# Patient Record
Sex: MALE | Race: WHITE | NOT HISPANIC OR LATINO | URBAN - NONMETROPOLITAN AREA
[De-identification: names, ages, dates, MRNs, and addresses within clinical notes are randomized per-mention and may not be internally consistent; named-entity substitution may affect disease eponyms.]

---

## 2021-12-28 ENCOUNTER — TRANSCRIBE ORDERS (OUTPATIENT)
Dept: LAB | Facility: HOSPITAL | Age: 41
End: 2021-12-28

## 2021-12-28 DIAGNOSIS — Z00.00 ROUTINE GENERAL MEDICAL EXAMINATION AT A HEALTH CARE FACILITY: Primary | ICD-10-CM

## 2021-12-28 DIAGNOSIS — E55.9 VITAMIN D DEFICIENCY: ICD-10-CM

## 2021-12-28 DIAGNOSIS — E06.5 HYPOTHYROIDISM DUE TO FIBROUS INVASIVE THYROIDITIS: ICD-10-CM

## 2021-12-28 DIAGNOSIS — E03.8 HYPOTHYROIDISM DUE TO FIBROUS INVASIVE THYROIDITIS: ICD-10-CM

## 2024-02-03 ENCOUNTER — HOSPITAL ENCOUNTER (EMERGENCY)
Facility: HOSPITAL | Age: 44
Discharge: HOME | End: 2024-02-03
Attending: EMERGENCY MEDICINE
Payer: COMMERCIAL

## 2024-02-03 VITALS
BODY MASS INDEX: 29.55 KG/M2 | SYSTOLIC BLOOD PRESSURE: 135 MMHG | OXYGEN SATURATION: 96 % | DIASTOLIC BLOOD PRESSURE: 93 MMHG | HEIGHT: 68 IN | WEIGHT: 195 LBS | TEMPERATURE: 97 F | HEART RATE: 74 BPM | RESPIRATION RATE: 14 BRPM

## 2024-02-03 DIAGNOSIS — E11.65 POORLY CONTROLLED DIABETES MELLITUS: ICD-10-CM

## 2024-02-03 DIAGNOSIS — E11.65 TYPE 2 DIABETES MELLITUS WITH HYPERGLYCEMIA, WITHOUT LONG-TERM CURRENT USE OF INSULIN: Primary | ICD-10-CM

## 2024-02-03 LAB
ALBUMIN SERPL-MCNC: 4.4 G/DL (ref 3.5–5.2)
ALBUMIN/GLOB SERPL: 1.6 G/DL
ALP SERPL-CCNC: 65 U/L (ref 39–117)
ALT SERPL W P-5'-P-CCNC: 56 U/L (ref 1–41)
ANION GAP SERPL CALCULATED.3IONS-SCNC: 14 MMOL/L (ref 5–15)
AST SERPL-CCNC: 43 U/L (ref 1–40)
B-OH-BUTYR SERPL-SCNC: 0.15 MMOL/L (ref 0.02–0.27)
BASOPHILS # BLD AUTO: 0.02 10*3/MM3 (ref 0–0.2)
BASOPHILS NFR BLD AUTO: 0.3 % (ref 0–1.5)
BILIRUB SERPL-MCNC: 0.4 MG/DL (ref 0–1.2)
BILIRUB UR QL STRIP: NEGATIVE
BUN SERPL-MCNC: 7 MG/DL (ref 6–20)
BUN/CREAT SERPL: 7 (ref 7–25)
CALCIUM SPEC-SCNC: 9.3 MG/DL (ref 8.6–10.5)
CHLORIDE SERPL-SCNC: 94 MMOL/L (ref 98–107)
CLARITY UR: CLEAR
CO2 SERPL-SCNC: 25 MMOL/L (ref 22–29)
COLOR UR: YELLOW
CREAT SERPL-MCNC: 1 MG/DL (ref 0.76–1.27)
DEPRECATED RDW RBC AUTO: 48 FL (ref 37–54)
EGFRCR SERPLBLD CKD-EPI 2021: 94 ML/MIN/1.73
EOSINOPHIL # BLD AUTO: 0.09 10*3/MM3 (ref 0–0.4)
EOSINOPHIL NFR BLD AUTO: 1.5 % (ref 0.3–6.2)
ERYTHROCYTE [DISTWIDTH] IN BLOOD BY AUTOMATED COUNT: 13.7 % (ref 12.3–15.4)
GLOBULIN UR ELPH-MCNC: 2.8 GM/DL
GLUCOSE BLDC GLUCOMTR-MCNC: 307 MG/DL (ref 70–130)
GLUCOSE BLDC GLUCOMTR-MCNC: 314 MG/DL (ref 70–130)
GLUCOSE SERPL-MCNC: 306 MG/DL (ref 65–99)
GLUCOSE UR STRIP-MCNC: ABNORMAL MG/DL
HCT VFR BLD AUTO: 45.1 % (ref 37.5–51)
HGB BLD-MCNC: 15.1 G/DL (ref 13–17.7)
HGB UR QL STRIP.AUTO: NEGATIVE
IMM GRANULOCYTES # BLD AUTO: 0.01 10*3/MM3 (ref 0–0.05)
IMM GRANULOCYTES NFR BLD AUTO: 0.2 % (ref 0–0.5)
KETONES UR QL STRIP: NEGATIVE
LEUKOCYTE ESTERASE UR QL STRIP.AUTO: NEGATIVE
LYMPHOCYTES # BLD AUTO: 2.38 10*3/MM3 (ref 0.7–3.1)
LYMPHOCYTES NFR BLD AUTO: 39.5 % (ref 19.6–45.3)
MCH RBC QN AUTO: 31.4 PG (ref 26.6–33)
MCHC RBC AUTO-ENTMCNC: 33.5 G/DL (ref 31.5–35.7)
MCV RBC AUTO: 93.8 FL (ref 79–97)
MONOCYTES # BLD AUTO: 0.49 10*3/MM3 (ref 0.1–0.9)
MONOCYTES NFR BLD AUTO: 8.1 % (ref 5–12)
NEUTROPHILS NFR BLD AUTO: 3.03 10*3/MM3 (ref 1.7–7)
NEUTROPHILS NFR BLD AUTO: 50.4 % (ref 42.7–76)
NITRITE UR QL STRIP: NEGATIVE
NRBC BLD AUTO-RTO: 0 /100 WBC (ref 0–0.2)
PH UR STRIP.AUTO: 6.5 [PH] (ref 5–8)
PLATELET # BLD AUTO: 196 10*3/MM3 (ref 140–450)
PMV BLD AUTO: 9.7 FL (ref 6–12)
POTASSIUM SERPL-SCNC: 3.4 MMOL/L (ref 3.5–5.2)
PROT SERPL-MCNC: 7.2 G/DL (ref 6–8.5)
PROT UR QL STRIP: NEGATIVE
RBC # BLD AUTO: 4.81 10*6/MM3 (ref 4.14–5.8)
SODIUM SERPL-SCNC: 133 MMOL/L (ref 136–145)
SP GR UR STRIP: 1.03 (ref 1–1.03)
UROBILINOGEN UR QL STRIP: ABNORMAL
WBC NRBC COR # BLD AUTO: 6.02 10*3/MM3 (ref 3.4–10.8)

## 2024-02-03 PROCEDURE — 82948 REAGENT STRIP/BLOOD GLUCOSE: CPT

## 2024-02-03 PROCEDURE — 36415 COLL VENOUS BLD VENIPUNCTURE: CPT

## 2024-02-03 PROCEDURE — 81003 URINALYSIS AUTO W/O SCOPE: CPT | Performed by: EMERGENCY MEDICINE

## 2024-02-03 PROCEDURE — 99283 EMERGENCY DEPT VISIT LOW MDM: CPT

## 2024-02-03 PROCEDURE — 85025 COMPLETE CBC W/AUTO DIFF WBC: CPT | Performed by: EMERGENCY MEDICINE

## 2024-02-03 PROCEDURE — 80053 COMPREHEN METABOLIC PANEL: CPT | Performed by: EMERGENCY MEDICINE

## 2024-02-03 PROCEDURE — 82010 KETONE BODYS QUAN: CPT | Performed by: EMERGENCY MEDICINE

## 2024-02-03 PROCEDURE — 25810000003 SODIUM CHLORIDE 0.9 % SOLUTION: Performed by: EMERGENCY MEDICINE

## 2024-02-03 RX ORDER — SODIUM CHLORIDE 0.9 % (FLUSH) 0.9 %
10 SYRINGE (ML) INJECTION AS NEEDED
Status: DISCONTINUED | OUTPATIENT
Start: 2024-02-03 | End: 2024-02-04 | Stop reason: HOSPADM

## 2024-02-03 RX ADMIN — SODIUM CHLORIDE 1000 ML: 9 INJECTION, SOLUTION INTRAVENOUS at 21:29

## 2024-02-03 NOTE — ED NOTES
Patient states he used to be diabetic, but he stopped taking his metformin. He states that his blood sugar today was 500

## 2024-02-03 NOTE — ED NOTES
"Pt to Er via PV. Pt states he has been feeling unwell for the last several days, reports his sugar today was in the 500s. States he used to take metformin and insulin but hasn't taken them for a while. Pt states he just feels \"a little off.\"   "

## 2024-02-04 NOTE — ED NOTES
Patient states that he has a history of IDDM. Patient states he took himself off of his insulin in 2018. Patient states that today he felt like he used to when his blood sugar was elevated. Patient states he used his old glucometer and his bgl was 511. Patient states he called a nurse with his insurance and she directed him to an  and UC sent him here.     Patient states he has not seen a regular doctor for 10 years - states that he would see an ER doctor for his diabetic medication.

## 2024-02-04 NOTE — DISCHARGE INSTRUCTIONS
Take metformin as prescribed.  Try to eat a low carbohydrate diet.  Follow-up with your new primary care provider later this month as scheduled.  Check your blood sugar regularly.  Return to the emergency department for chest pain, abdominal pain, vomiting, weakness, or other concern.

## 2024-02-04 NOTE — ED PROVIDER NOTES
" EMERGENCY DEPARTMENT ENCOUNTER    Room Number:    PCP: System, Provider Not In  Historian: Patient, spouse      HPI:  Chief Complaint: Elevated blood sugar  A complete HPI/ROS/PMH/PSH/SH/FH are unobtainable due to: Nothing  Context: Guilherme Garcia is a 44 y.o. male with a medical history of hyperglycemia who presents to the ED c/o acute elevated blood sugar.  Patient states he began feeling slightly \"woozy\" and lightheaded earlier this afternoon.  He checked his blood sugar and it was 511.  He drank some water and rechecked it and it was down into the 400s.  He called the nurse hotline for his health insurance and was advised to come to the ED.  He has not seen a primary care provider or had a routine checkup in many years.  He states that he was told he was prediabetic in 2018 after going to the ED.  He took insulin and metformin for couple of years but then stopped.  He also reports increased thirst and increased urinary frequency recently.  He has also had some nasal congestion.  Denies fever, chills, sore throat, cough, shortness of breath, chest pain, Sohan pain, vomiting, diarrhea, or dysuria.  He has an appointment with a new PCP later this month.            PAST MEDICAL HISTORY  Active Ambulatory Problems     Diagnosis Date Noted    No Active Ambulatory Problems     Resolved Ambulatory Problems     Diagnosis Date Noted    No Resolved Ambulatory Problems     Past Medical History:   Diagnosis Date    Diabetes mellitus          PAST SURGICAL HISTORY  History reviewed. No pertinent surgical history.      FAMILY HISTORY  History reviewed. No pertinent family history.      SOCIAL HISTORY  Social History     Socioeconomic History    Marital status:    Tobacco Use    Smoking status: Former     Types: Cigarettes     Quit date: 2018     Years since quittin.0   Vaping Use    Vaping Use: Every day    Substances: Nicotine    Devices: Refillable tank   Substance and Sexual Activity    Alcohol use: Not " Currently    Drug use: Not Currently     Comment: hx of narcotic pain medication    Sexual activity: Defer         ALLERGIES  Patient has no known allergies.    REVIEW OF SYSTEMS  Review of Systems  Included in HPI  All systems reviewed and negative except for those discussed in HPI.      PHYSICAL EXAM  ED Triage Vitals   Temp Heart Rate Resp BP SpO2   02/03/24 1803 02/03/24 1803 02/03/24 1803 02/03/24 1838 02/03/24 1803   97 °F (36.1 °C) 83 16 132/100 97 %      Temp src Heart Rate Source Patient Position BP Location FiO2 (%)   -- -- -- -- --              Physical Exam      GENERAL: Awake, alert, oriented x 3.  Well-developed, well-nourished and nontoxic-appearing male.  Resting comfortably in no acute distress  HENT: NCAT, nares patent, moist mucous membranes  EYES: no scleral icterus  CV: regular rhythm, normal rate  RESPIRATORY: normal effort, clear to auscultation bilaterally  ABDOMEN: soft, nondistended, nontender  MUSCULOSKELETAL: Extremities are nontender with full range of motion  NEURO: Speech is normal.  No facial droop.  Follows commands  PSYCH:  calm, cooperative  SKIN: warm, dry    Vital signs and nursing notes reviewed.          LAB RESULTS  Recent Results (from the past 24 hour(s))   POC Glucose Once    Collection Time: 02/03/24  6:36 PM    Specimen: Blood   Result Value Ref Range    Glucose 314 (H) 70 - 130 mg/dL   Urinalysis With Microscopic If Indicated (No Culture) - Urine, Clean Catch    Collection Time: 02/03/24  8:22 PM    Specimen: Urine, Clean Catch   Result Value Ref Range    Color, UA Yellow Yellow, Straw    Appearance, UA Clear Clear    pH, UA 6.5 5.0 - 8.0    Specific Gravity, UA 1.026 1.005 - 1.030    Glucose, UA >=1000 mg/dL (3+) (A) Negative    Ketones, UA Negative Negative    Bilirubin, UA Negative Negative    Blood, UA Negative Negative    Protein, UA Negative Negative    Leuk Esterase, UA Negative Negative    Nitrite, UA Negative Negative    Urobilinogen, UA 0.2 E.U./dL 0.2 - 1.0  E.U./dL   Comprehensive Metabolic Panel    Collection Time: 02/03/24  9:04 PM    Specimen: Blood   Result Value Ref Range    Glucose 306 (H) 65 - 99 mg/dL    BUN 7 6 - 20 mg/dL    Creatinine 1.00 0.76 - 1.27 mg/dL    Sodium 133 (L) 136 - 145 mmol/L    Potassium 3.4 (L) 3.5 - 5.2 mmol/L    Chloride 94 (L) 98 - 107 mmol/L    CO2 25.0 22.0 - 29.0 mmol/L    Calcium 9.3 8.6 - 10.5 mg/dL    Total Protein 7.2 6.0 - 8.5 g/dL    Albumin 4.4 3.5 - 5.2 g/dL    ALT (SGPT) 56 (H) 1 - 41 U/L    AST (SGOT) 43 (H) 1 - 40 U/L    Alkaline Phosphatase 65 39 - 117 U/L    Total Bilirubin 0.4 0.0 - 1.2 mg/dL    Globulin 2.8 gm/dL    A/G Ratio 1.6 g/dL    BUN/Creatinine Ratio 7.0 7.0 - 25.0    Anion Gap 14.0 5.0 - 15.0 mmol/L    eGFR 94.0 >60.0 mL/min/1.73   CBC Auto Differential    Collection Time: 02/03/24  9:04 PM    Specimen: Blood   Result Value Ref Range    WBC 6.02 3.40 - 10.80 10*3/mm3    RBC 4.81 4.14 - 5.80 10*6/mm3    Hemoglobin 15.1 13.0 - 17.7 g/dL    Hematocrit 45.1 37.5 - 51.0 %    MCV 93.8 79.0 - 97.0 fL    MCH 31.4 26.6 - 33.0 pg    MCHC 33.5 31.5 - 35.7 g/dL    RDW 13.7 12.3 - 15.4 %    RDW-SD 48.0 37.0 - 54.0 fl    MPV 9.7 6.0 - 12.0 fL    Platelets 196 140 - 450 10*3/mm3    Neutrophil % 50.4 42.7 - 76.0 %    Lymphocyte % 39.5 19.6 - 45.3 %    Monocyte % 8.1 5.0 - 12.0 %    Eosinophil % 1.5 0.3 - 6.2 %    Basophil % 0.3 0.0 - 1.5 %    Immature Grans % 0.2 0.0 - 0.5 %    Neutrophils, Absolute 3.03 1.70 - 7.00 10*3/mm3    Lymphocytes, Absolute 2.38 0.70 - 3.10 10*3/mm3    Monocytes, Absolute 0.49 0.10 - 0.90 10*3/mm3    Eosinophils, Absolute 0.09 0.00 - 0.40 10*3/mm3    Basophils, Absolute 0.02 0.00 - 0.20 10*3/mm3    Immature Grans, Absolute 0.01 0.00 - 0.05 10*3/mm3    nRBC 0.0 0.0 - 0.2 /100 WBC   Beta Hydroxybutyrate Quantitative    Collection Time: 02/03/24  9:04 PM    Specimen: Blood   Result Value Ref Range    Beta-Hydroxybutyrate Quant 0.154 0.020 - 0.270 mmol/L   POC Glucose Once    Collection Time: 02/03/24  10:15 PM    Specimen: Blood   Result Value Ref Range    Glucose 307 (H) 70 - 130 mg/dL       Ordered the above labs and reviewed the results.        RADIOLOGY  No Radiology Exams Resulted Within Past 24 Hours    Ordered the above noted radiological studies. Reviewed by me in PACS.            PROCEDURES  Procedures        OUTPATIENT MEDICATION MANAGEMENT:  No current Epic-ordered facility-administered medications on file.     Current Outpatient Medications Ordered in Epic   Medication Sig Dispense Refill    metFORMIN (GLUCOPHAGE) 500 MG tablet Take 1 tablet by mouth 2 (Two) Times a Day With Meals. 60 tablet 0    METHADONE HCL PO Take  by mouth Daily. Unknown dosage             MEDICATIONS GIVEN IN ER  Medications   sodium chloride 0.9 % bolus 1,000 mL (0 mL Intravenous Stopped 2/3/24 2236)                   MEDICAL DECISION MAKING, PROGRESS, and CONSULTS    All labs have been independently reviewed by me.  All radiology studies have been reviewed by me and I have also reviewed the radiology report.   EKG's independently viewed and interpreted by me.  Discussion below represents my analysis of pertinent findings related to patient's condition, differential diagnosis, treatment plan and final disposition.      Additional sources:    - Discussed/ obtained information from independent historians: Spouse at bedside    -Prescription drug monitoring program review:     N/A      - External (non-ED) record review: Patient was seen at another ED in September 2019 for right foot pain.  He does not have any prior ED visits or admissions here.    - Chronic or social conditions impacting patient care (Social Determinants of Health): Health Care System (Health Coverage, Provider Availability, Provider Linguistic and Cultural Competency, Quality of Care)     Patient has not had or seen a primary care provider for many years.          Orders placed during this visit:  Orders Placed This Encounter   Procedures    Comprehensive  Metabolic Panel    Urinalysis With Microscopic If Indicated (No Culture) - Urine, Clean Catch    CBC Auto Differential    Beta Hydroxybutyrate Quantitative    POC Glucose Once    POC Glucose Once    CBC & Differential    Ketone Bodies, Serum (Not performed at [place])         Additional orders considered but not ordered:          Differential diagnosis includes, but is not limited to:    Uncontrolled diabetes, DKA, electrolyte abnormality, dehydration, UTI      Independent interpretation of labs, radiology studies, and discussions with consultants:  ED Course as of 02/04/24 0133   Sat Feb 03, 2024 1955 Glucose(!): 314 [WH]   2049 Ketones, UA: Negative [WH]   2121 WBC: 6.02 [WH]   2121 Hemoglobin: 15.1 [WH]   2203 Beta-Hydroxybutyrate Quant: 0.154 []   2203 Glucose(!): 306 [WH]   2203 Creatinine: 1.00 [WH]   2203 Sodium(!): 133 [WH]   2203 CO2: 25.0 [WH]   2203 Anion Gap: 14.0 []   2218 MDM: Patient presented to ED complaining of elevated blood sugar.  He was diagnosed with prediabetes in 2018 and was started on metformin and insulin.  However, he only took these medications for short time.  Here in the ED, initial glucose was 316.  Patient was given IV fluids.  He was not in DKA.  Renal function was normal.  He was not dehydrated.  He did not have a UTI.  He will be started on metformin.  He has an appointment with a new primary care provider in 2 weeks. []      ED Course User Index  [] Иван Bee MD         COMPLEXITY OF CARE  Admission was considered but after careful review of the patient's presentation, physical examination, diagnostic results, and response to treatment the patient may be safely discharged with outpatient follow-up.      DIAGNOSIS  Final diagnoses:   Type 2 diabetes mellitus with hyperglycemia, without long-term current use of insulin   Poorly controlled diabetes mellitus         DISPOSITION  DISCHARGE    Patient discharged in stable condition.    Reviewed implications of  results, diagnosis, meds, responsibility to follow up, warning signs and symptoms of possible worsening, potential complications and reasons to return to ER, including chest pain, shortness of breath, abdominal pain, vomiting, fever, or other concern.    Patient/Family voiced understanding of above instructions.    Discussed plan for discharge, as there is no emergent indication for admission. Patient referred to primary care provider for BP management due to today's BP. Pt/family is agreeable and understands need for follow up and repeat testing.  Pt is aware that discharge does not mean that nothing is wrong but it indicates no emergency is present that requires admission and they must continue care with follow-up as given below or physician of their choice.     FOLLOW-UP  Your primary care provider    Go in 2 weeks  As scheduled         Medication List        New Prescriptions      metFORMIN 500 MG tablet  Commonly known as: GLUCOPHAGE  Take 1 tablet by mouth 2 (Two) Times a Day With Meals.               Where to Get Your Medications        These medications were sent to [id number] - [place], [place] - [address] [address] AT SEC OF [place] [address] & [place] [address] - [phone] [facility] [phone] [address] [place] [address] [address], [facility] [address]      Phone: [phone]   metFORMIN 500 MG tablet                   Latest Documented Vital Signs:  AS OF 01:33 EST VITALS:    BP - 135/93  HR - 74  TEMP - 97 °F (36.1 °C)  O2 SATS - 96%            --    Please note that portions of this were completed with a voice recognition program.       Note Disclaimer: At [facility], we believe that sharing information builds trust and better relationships. You are receiving this note because you are receiving care at [facility] or recently visited. It is possible you will see health information before a provider has talked with you about it. This kind of information can be easy to misunderstand. To help you  fully understand what it means for your health, we urge you to discuss this note with your provider.             Иван Bee MD  02/04/24 0133

## 2024-02-07 ENCOUNTER — OFFICE VISIT (OUTPATIENT)
Dept: FAMILY MEDICINE CLINIC | Facility: CLINIC | Age: 44
End: 2024-02-07
Payer: COMMERCIAL

## 2024-02-07 VITALS
WEIGHT: 188 LBS | RESPIRATION RATE: 14 BRPM | BODY MASS INDEX: 28.49 KG/M2 | OXYGEN SATURATION: 97 % | TEMPERATURE: 98.1 F | SYSTOLIC BLOOD PRESSURE: 130 MMHG | HEART RATE: 88 BPM | HEIGHT: 68 IN | DIASTOLIC BLOOD PRESSURE: 88 MMHG

## 2024-02-07 DIAGNOSIS — M10.9 GOUT, UNSPECIFIED CAUSE, UNSPECIFIED CHRONICITY, UNSPECIFIED SITE: ICD-10-CM

## 2024-02-07 DIAGNOSIS — E11.65 UNCONTROLLED TYPE 2 DIABETES MELLITUS WITH HYPERGLYCEMIA: Primary | ICD-10-CM

## 2024-02-07 DIAGNOSIS — Z86.19 HISTORY OF HEPATITIS B: ICD-10-CM

## 2024-02-07 DIAGNOSIS — Z12.11 ENCOUNTER FOR SCREENING FOR MALIGNANT NEOPLASM OF COLON: ICD-10-CM

## 2024-02-07 DIAGNOSIS — R04.2 SPITTING UP BLOOD: ICD-10-CM

## 2024-02-07 DIAGNOSIS — K21.9 GASTROESOPHAGEAL REFLUX DISEASE, UNSPECIFIED WHETHER ESOPHAGITIS PRESENT: ICD-10-CM

## 2024-02-07 DIAGNOSIS — Z11.59 NEED FOR HEPATITIS C SCREENING TEST: ICD-10-CM

## 2024-02-07 PROCEDURE — 3046F HEMOGLOBIN A1C LEVEL >9.0%: CPT | Performed by: PHYSICIAN ASSISTANT

## 2024-02-07 PROCEDURE — 1159F MED LIST DOCD IN RCRD: CPT | Performed by: PHYSICIAN ASSISTANT

## 2024-02-07 PROCEDURE — 1160F RVW MEDS BY RX/DR IN RCRD: CPT | Performed by: PHYSICIAN ASSISTANT

## 2024-02-07 PROCEDURE — 99204 OFFICE O/P NEW MOD 45 MIN: CPT | Performed by: PHYSICIAN ASSISTANT

## 2024-02-07 RX ORDER — BLOOD-GLUCOSE METER
1 KIT MISCELLANEOUS DAILY
Qty: 1 KIT | Refills: 0 | Status: SHIPPED | OUTPATIENT
Start: 2024-02-07

## 2024-02-07 RX ORDER — BLOOD SUGAR DIAGNOSTIC
STRIP MISCELLANEOUS
Qty: 100 EACH | Refills: 0 | Status: SHIPPED | OUTPATIENT
Start: 2024-02-07 | End: 2024-02-28 | Stop reason: SDUPTHER

## 2024-02-07 RX ORDER — LANCETS
EACH MISCELLANEOUS
Qty: 100 EACH | Refills: 0 | Status: SHIPPED | OUTPATIENT
Start: 2024-02-07

## 2024-02-07 NOTE — LETTER
March 1, 2024     [facility]  [address]  [facility] [address]    Patient: Guilherme Garcia   YOB: 1980   Date of Visit: 2/7/2024     Dear [facility]:       Thank you for referring Guilherme Garcia to me for evaluation. Below are the relevant portions of my assessment and plan of care.    If you have questions, please do not hesitate to call me. I look forward to following Guilherme along with you.         Sincerely,        BLAZE Muhammad        CC: No Recipients    Michelle Cabral PA  02/26/24 0725  Signed  Subjective  Guilherme Garcia is a 44 y.o. male who presents today as a new patient to establish care.     History of Present Illness     Previous PCP- not in decades. Dr Almeida- [facility].   [facility] and hospital in [place] has most of records. Previously [facility] but bought out by [facility].   Previously admit at that hospital- had Hepatitis B in the past. Did not know was diabetic but found out was diabetic. They told with watching diet, could be corrected. Was on Insulin and Metformin    A1C 15.1%, Glucose 314. Sodium 133.     With Metformin 250 has been the lowest and up to 350.   Not sure how long on [employer].   He reports he would like to get One Touch Verio meter, strips and lancets. He has been using a meter that he borrowed from someone but has to give back.     He is only on Metformin 500 mg twice daily. Increased to 3 x daily when glucose was elevated. 6-7 am, noon, and supper.    In the past, has only     Nasal congestion.   Glucose 511 at ER. Drank water and rechecked at 400s. Increased thirst and urination.   Prediabetes 2018. Reports he then took insulin and metformin for a couple years but stopped.     Has had increased indigestion. Does not cause bleeding. A couple times a week, awakens with blood in mouth that has to cough out.  Started this month. No unintentional weight loss. No night  sweats.     Hepatitis B- 2017 or 2018 reports he was paralyzed and unable to walk. He had to go to rehab and learn to walk again. Reports he was sent to [facility] because [facility] could not handle. Rehab was in [place]. [facility] started on Insulin and Metformin.     Past Medical History:   Diagnosis Date   • Hepatitis B     TOOK MEDS   • Bleeding in mouth     WHEN WAKES UP IN THE AM   • Diabetes mellitus    • Dry throat     AND IRRITATED WHEN WAKE UP MORNING   • GERD (gastroesophageal reflux disease)    • History of kidney stones      Past Surgical History:   Procedure Laterality Date   • ENDOSCOPY N/A 2024    Procedure: ESOPHAGOGASTRODUODENOSCOPY with cold biopsies;  Surgeon: Elijah Amaral MD;  Location: [facility] ENDOSCOPY;  Service: Gastroenterology;  Laterality: N/A;  pre: dyspepsia, globus  post: esophagitis, gastritis, hiatal hernia   • KIDNEY STONE SURGERY       Social History     Socioeconomic History   • Marital status:    Tobacco Use   • Smoking status: Former     Packs/day: 1.00     Years: 27.00     Additional pack years: 0.00     Total pack years: 27.00     Types: Cigarettes     Start date:      Quit date:      Years since quittin.1   • Smokeless tobacco: Never   Vaping Use   • Vaping Use: Every day   • Substances: Nicotine   • Devices: Refillable tank   Substance and Sexual Activity   • Alcohol use: Not Currently   • Drug use: Not Currently     Comment: hx of narcotic pain medication SOBER SINCE 2022   • Sexual activity: Defer      Family History   Problem Relation Age of Onset   • Malig Hyperthermia Neg Hx         The following portions of the patient's history were reviewed and updated as appropriate: allergies, current medications, past family history, past medical history, past social history, past surgical history and problem list.    Review of Systems    Objective  Vitals:    24 1043   BP: 130/88   Pulse: 88   Resp: 14   Temp: 98.1 °F (36.7 °C)   SpO2:  97%     Body mass index is 28.58 kg/m².    Physical Exam  Vitals and nursing note reviewed.   Constitutional:       Appearance: He is well-developed.   HENT:      Head: Normocephalic and atraumatic.      Right Ear: External ear normal.      Left Ear: External ear normal.   Eyes:      Conjunctiva/sclera: Conjunctivae normal.   Neck:      Thyroid: No thyroid mass or thyromegaly.      Vascular: No carotid bruit.      Trachea: No tracheal deviation.   Cardiovascular:      Rate and Rhythm: Normal rate and regular rhythm.      Heart sounds: Normal heart sounds.   Pulmonary:      Effort: Pulmonary effort is normal.      Breath sounds: Normal breath sounds.   Skin:     General: Skin is warm and dry.   Neurological:      Mental Status: He is alert and oriented to person, place, and time.      Gait: Gait normal.   Psychiatric:         Behavior: Behavior normal.         Thought Content: Thought content normal.         Assessment & Plan  Diagnoses and all orders for this visit:    1. Uncontrolled type 2 diabetes mellitus with hyperglycemia (Primary)  -     Hepatitis C Antibody  -     Microalbumin / Creatinine Urine Ratio - Urine, Clean Catch  -     CBC & Differential  -     Comprehensive Metabolic Panel  -     Hemoglobin A1c  -     CK  -     Lipid Panel With LDL / HDL Ratio  -     Iron and TIBC  -     Ferritin  -     Vitamin B12 & Folate  -     Vitamin D,25-Hydroxy  -     TSH  -     T4, free  -     T3, Free  -     Uric Acid  -     Urinalysis With Culture If Indicated -  -     Hepatitis B Surface Antigen  -     HBV Quant PCR Rfx to Genotype  -     Ambulatory Referral to ENT (Otolaryngology)  -     CT Chest Without Contrast; Future  -     Ambulatory Referral to Gastroenterology  -     metFORMIN (GLUCOPHAGE) 500 MG tablet; Take 2 tablets by mouth 2 (Two) Times a Day With Meals.  Dispense: 120 tablet; Refill: 0  -     glucose blood (OneTouch Verio) test strip; Check glucose 3 x daily  Dispense: 100 each; Refill: 0  -     Lancets  "(onetouch ultrasoft) lancets; Check glucose 3 x daily for hyperglycemia  Dispense: 100 each; Refill: 0  -     Blood Glucose Monitoring Suppl (OneTouch Verio Reflect) w/Device kit; Use 1 each Daily.  Dispense: 1 kit; Refill: 0  -     Microscopic Examination -    2. Gout, unspecified cause, unspecified chronicity, unspecified site  -     Uric Acid    3. Spitting up blood  -     Ambulatory Referral to ENT (Otolaryngology)  -     CT Chest Without Contrast; Future  -     Ambulatory Referral to Gastroenterology    4. Gastroesophageal reflux disease, unspecified whether esophagitis present  -     Ambulatory Referral to Gastroenterology    5. History of hepatitis B  -     Hepatitis B Surface Antigen  -     HBV Quant PCR Rfx to Genotype    6. Need for hepatitis C screening test  -     Hepatitis C Antibody    7. Encounter for screening for malignant neoplasm of colon        Assessment and Plan  Patient is establishing care after hospital visit after feeling \"woozy\" and light-headed and checking glucose at home at 511.  He was started on Metformin and is establishing care due to no PCP in years. Patient will have fasting labs. Call if no results in 1 week. Stability of conditions, plan, follow up, and further recommendations pending labs. Follow up in 2 weeks for review of records, glucose log, and further evaluation.     Uncontrolled, neglected diabetes mellitus with hyperglycemia- He is a poor historian but has not seen PCP in years. He was told prediabetic in ER 2018 but reports he was given Metformin and Insulin. He reports taking for a couple years then stopping without direction of follow up. He then started to feel light-headed and also had polyuria, polydipsia, and some nasal congestion. Glucose was noted to bee 511 and he went to ER. A1C was 15.1%. He was started on Metformin 500 mg twice daily and discharged from ER to see us.  I will check additional labs and make further recommendations. He will likely need " "additional medication. We will also need to ensure he updates diabetic eye and foot exams and I will give glucometer to monitor glucose fasting in the morning, 2 hours after largest meal, and bedtime. To submit log in the next 3-7 days.   History of gout- He has possible history of gout. I will check uric acid with labs. No symptoms today. To be seen if recurrence of symptoms.   GERD, spitting up blood- He reports having very uncontrolled. GERD symptoms but also reports \"spitting up\" blood that he is unsure if he is coughing up, is coming from uncontrolled GERD, or if this is from drainage from sinuses. He is a very poor historian and initially reported symptoms to get advice for a friend then admitted symptoms were from him. I will check labs, refer for CT chest, to GI to consider EGD and colonoscopy, and to ENT for evaluation of sinuses and consideration of laryngoscope. For now, I will start Protonix 40 mg daily. To ER ASAP if bleeding.   Possible history of hepatitis B- He is a poor historian but may have had history of hepatitis B and hospitalization in the past. I asked that he sign a release for hospital records and I will check hepatitis B and C testing. Of note, he has Methadone on medication list- it is unclear the history of drug use or possible risk factors for hepatitis. I will discuss this more at follow up with review of records.     I spent 35 minutes caring for Guilherme Garcia on this date of service. This time includes time spent by me in the following activities as necessary: preparing for the visit, reviewing tests, specialists records and previous visits, obtaining and/or reviewing a separately obtained history, performing a medically appropriate exam and/or evaluation, counseling and educating the patient, family, caregiver, referring and/or communicating with other healthcare professionals, documenting information in the medical record, independently interpreting results and communicating that " information with the patient, family, caregiver, and developing a medically appropriate treatment plan with consideration of other conditions, medications, and treatments.

## 2024-02-07 NOTE — LETTER
March 1, 2024     [facility]  [address]  [facility] [address]    Patient: Giulherme Garcia   YOB: 1980   Date of Visit: 2/7/2024     Dear [facility]:       Thank you for referring Guilherme Garcia to me for evaluation. Below are the relevant portions of my assessment and plan of care.    If you have questions, please do not hesitate to call me. I look forward to following Guilherme along with you.         Sincerely,        BLAZE Muhammad        CC: No Recipients    Michelle Cabral PA  02/26/24 0725  Signed  Subjective  Guilherme Garcia is a 44 y.o. male who presents today as a new patient to establish care.     History of Present Illness     Previous PCP- not in decades. Dr Almeida- [facility].   [facility] and hospital in [place] has most of records. Previously [facility] but bought out by [place].   Previously admit at that hospital- had Hepatitis B in the past. Did not know was diabetic but found out was diabetic. They told with watching diet, could be corrected. Was on Insulin and Metformin    A1C 15.1%, Glucose 314. Sodium 133.     With Metformin 250 has been the lowest and up to 350.   Not sure how long on Passport.   He reports he would like to get One Touch Verio meter, strips and lancets. He has been using a meter that he borrowed from someone but has to give back.     He is only on Metformin 500 mg twice daily. Increased to 3 x daily when glucose was elevated. 6-7 am, noon, and supper.    In the past, has only     Nasal congestion.   Glucose 511 at ER. Drank water and rechecked at 400s. Increased thirst and urination.   Prediabetes 2018. Reports he then took insulin and metformin for a couple years but stopped.     Has had increased indigestion. Does not cause bleeding. A couple times a week, awakens with blood in mouth that has to cough out.  Started this month. No unintentional weight loss. No night  sweats.     Hepatitis B- 2017 or 2018 reports he was paralyzed and unable to walk. He had to go to rehab and learn to walk again. Reports he was sent to [facility] because [employer] could not handle. Rehab was in [place]. [facility] started on Insulin and Metformin.     Past Medical History:   Diagnosis Date   • Hepatitis B     TOOK MEDS   • Bleeding in mouth     WHEN WAKES UP IN THE AM   • Diabetes mellitus    • Dry throat     AND IRRITATED WHEN WAKE UP MORNING   • GERD (gastroesophageal reflux disease)    • History of kidney stones      Past Surgical History:   Procedure Laterality Date   • ENDOSCOPY N/A 2024    Procedure: ESOPHAGOGASTRODUODENOSCOPY with cold biopsies;  Surgeon: Elijah Amaral MD;  Location: [facility] ENDOSCOPY;  Service: Gastroenterology;  Laterality: N/A;  pre: dyspepsia, globus  post: esophagitis, gastritis, hiatal hernia   • KIDNEY STONE SURGERY       Social History     Socioeconomic History   • Marital status:    Tobacco Use   • Smoking status: Former     Packs/day: 1.00     Years: 27.00     Additional pack years: 0.00     Total pack years: 27.00     Types: Cigarettes     Start date:      Quit date:      Years since quittin.1   • Smokeless tobacco: Never   Vaping Use   • Vaping Use: Every day   • Substances: Nicotine   • Devices: Refillable tank   Substance and Sexual Activity   • Alcohol use: Not Currently   • Drug use: Not Currently     Comment: hx of narcotic pain medication SOBER SINCE 2022   • Sexual activity: Defer      Family History   Problem Relation Age of Onset   • Malig Hyperthermia Neg Hx         The following portions of the patient's history were reviewed and updated as appropriate: allergies, current medications, past family history, past medical history, past social history, past surgical history and problem list.    Review of Systems    Objective  Vitals:    24 1043   BP: 130/88   Pulse: 88   Resp: 14   Temp: 98.1 °F (36.7 °C)   SpO2:  97%     Body mass index is 28.58 kg/m².    Physical Exam  Vitals and nursing note reviewed.   Constitutional:       Appearance: He is well-developed.   HENT:      Head: Normocephalic and atraumatic.      Right Ear: External ear normal.      Left Ear: External ear normal.   Eyes:      Conjunctiva/sclera: Conjunctivae normal.   Neck:      Thyroid: No thyroid mass or thyromegaly.      Vascular: No carotid bruit.      Trachea: No tracheal deviation.   Cardiovascular:      Rate and Rhythm: Normal rate and regular rhythm.      Heart sounds: Normal heart sounds.   Pulmonary:      Effort: Pulmonary effort is normal.      Breath sounds: Normal breath sounds.   Skin:     General: Skin is warm and dry.   Neurological:      Mental Status: He is alert and oriented to person, place, and time.      Gait: Gait normal.   Psychiatric:         Behavior: Behavior normal.         Thought Content: Thought content normal.         Assessment & Plan  Diagnoses and all orders for this visit:    1. Uncontrolled type 2 diabetes mellitus with hyperglycemia (Primary)  -     Hepatitis C Antibody  -     Microalbumin / Creatinine Urine Ratio - Urine, Clean Catch  -     CBC & Differential  -     Comprehensive Metabolic Panel  -     Hemoglobin A1c  -     CK  -     Lipid Panel With LDL / HDL Ratio  -     Iron and TIBC  -     Ferritin  -     Vitamin B12 & Folate  -     Vitamin D,25-Hydroxy  -     TSH  -     T4, free  -     T3, Free  -     Uric Acid  -     Urinalysis With Culture If Indicated -  -     Hepatitis B Surface Antigen  -     HBV Quant PCR Rfx to Genotype  -     Ambulatory Referral to ENT (Otolaryngology)  -     CT Chest Without Contrast; Future  -     Ambulatory Referral to Gastroenterology  -     metFORMIN (GLUCOPHAGE) 500 MG tablet; Take 2 tablets by mouth 2 (Two) Times a Day With Meals.  Dispense: 120 tablet; Refill: 0  -     glucose blood (OneTouch Verio) test strip; Check glucose 3 x daily  Dispense: 100 each; Refill: 0  -     Lancets  "(onetouch ultrasoft) lancets; Check glucose 3 x daily for hyperglycemia  Dispense: 100 each; Refill: 0  -     Blood Glucose Monitoring Suppl (OneTouch Verio Reflect) w/Device kit; Use 1 each Daily.  Dispense: 1 kit; Refill: 0  -     Microscopic Examination -    2. Gout, unspecified cause, unspecified chronicity, unspecified site  -     Uric Acid    3. Spitting up blood  -     Ambulatory Referral to ENT (Otolaryngology)  -     CT Chest Without Contrast; Future  -     Ambulatory Referral to Gastroenterology    4. Gastroesophageal reflux disease, unspecified whether esophagitis present  -     Ambulatory Referral to Gastroenterology    5. History of hepatitis B  -     Hepatitis B Surface Antigen  -     HBV Quant PCR Rfx to Genotype    6. Need for hepatitis C screening test  -     Hepatitis C Antibody    7. Encounter for screening for malignant neoplasm of colon        Assessment and Plan  Patient is establishing care after hospital visit after feeling \"woozy\" and light-headed and checking glucose at home at 511.  He was started on Metformin and is establishing care due to no PCP in years. Patient will have fasting labs. Call if no results in 1 week. Stability of conditions, plan, follow up, and further recommendations pending labs. Follow up in 2 weeks for review of records, glucose log, and further evaluation.     Uncontrolled, neglected diabetes mellitus with hyperglycemia- He is a poor historian but has not seen PCP in years. He was told prediabetic in ER 2018 but reports he was given Metformin and Insulin. He reports taking for a couple years then stopping without direction of follow up. He then started to feel light-headed and also had polyuria, polydipsia, and some nasal congestion. Glucose was noted to bee 511 and he went to ER. A1C was 15.1%. He was started on Metformin 500 mg twice daily and discharged from ER to see us.  I will check additional labs and make further recommendations. He will likely need " "additional medication. We will also need to ensure he updates diabetic eye and foot exams and I will give glucometer to monitor glucose fasting in the morning, 2 hours after largest meal, and bedtime. To submit log in the next 3-7 days.   History of gout- He has possible history of gout. I will check uric acid with labs. No symptoms today. To be seen if recurrence of symptoms.   GERD, spitting up blood- He reports having very uncontrolled. GERD symptoms but also reports \"spitting up\" blood that he is unsure if he is coughing up, is coming from uncontrolled GERD, or if this is from drainage from sinuses. He is a very poor historian and initially reported symptoms to get advice for a friend then admitted symptoms were from him. I will check labs, refer for CT chest, to GI to consider EGD and colonoscopy, and to ENT for evaluation of sinuses and consideration of laryngoscope. For now, I will start Protonix 40 mg daily. To ER ASAP if bleeding.   Possible history of hepatitis B- He is a poor historian but may have had history of hepatitis B and hospitalization in the past. I asked that he sign a release for hospital records and I will check hepatitis B and C testing. Of note, he has Methadone on medication list- it is unclear the history of drug use or possible risk factors for hepatitis. I will discuss this more at follow up with review of records.     I spent 35 minutes caring for Guilherme Garcia on this date of service. This time includes time spent by me in the following activities as necessary: preparing for the visit, reviewing tests, specialists records and previous visits, obtaining and/or reviewing a separately obtained history, performing a medically appropriate exam and/or evaluation, counseling and educating the patient, family, caregiver, referring and/or communicating with other healthcare professionals, documenting information in the medical record, independently interpreting results and communicating that " information with the patient, family, caregiver, and developing a medically appropriate treatment plan with consideration of other conditions, medications, and treatments.

## 2024-02-07 NOTE — PROGRESS NOTES
Subjective   Guilherme Garcia is a 44 y.o. male who presents today as a new patient to establish care.     History of Present Illness     Previous PCP- not in decades. Dr Almeida- [facility].   [facility] and hospital in [place] has most of records. Previously [facility] but bought out by [employer].   Previously admit at that hospital- had Hepatitis B in the past. Did not know was diabetic but found out was diabetic. They told with watching diet, could be corrected. Was on Insulin and Metformin    A1C 15.1%, Glucose 314. Sodium 133.     With Metformin 250 has been the lowest and up to 350.   Not sure how long on [employer]port.   He reports he would like to get One Touch Verio meter, strips and lancets. He has been using a meter that he borrowed from someone but has to give back.     He is only on Metformin 500 mg twice daily. Increased to 3 x daily when glucose was elevated. 6-7 am, noon, and supper.    In the past, has only     Nasal congestion.   Glucose 511 at ER. Drank water and rechecked at 400s. Increased thirst and urination.   Prediabetes 2018. Reports he then took insulin and metformin for a couple years but stopped.     Has had increased indigestion. Does not cause bleeding. A couple times a week, awakens with blood in mouth that has to cough out.  Started this month. No unintentional weight loss. No night sweats.     Hepatitis B- 2017 or 2018 reports he was paralyzed and unable to walk. He had to go to rehab and learn to walk again. Reports he was sent to [facility] because [employer] could not handle. Rehab was in [place]. [facility] started on Insulin and Metformin.     Past Medical History:   Diagnosis Date    Hepatitis B 2014    TOOK MEDS    Bleeding in mouth     WHEN WAKES UP IN THE AM    Diabetes mellitus     Dry throat     AND IRRITATED WHEN WAKE UP MORNING    GERD (gastroesophageal reflux disease)     History of kidney stones      Past Surgical History:   Procedure Laterality Date     ENDOSCOPY N/A 2024    Procedure: ESOPHAGOGASTRODUODENOSCOPY with cold biopsies;  Surgeon: Elijah Amaral MD;  [facility]: [facility] ENDOSCOPY;  Service: Gastroenterology;  Laterality: N/A;  pre: dyspepsia, globus  post: esophagitis, gastritis, hiatal hernia    KIDNEY STONE SURGERY       Social History     Socioeconomic History    Marital status:    Tobacco Use    Smoking status: Former     Packs/day: 1.00     Years: 27.00     Additional pack years: 0.00     Total pack years: 27.00     Types: Cigarettes     Start date:      Quit date:      Years since quittin.1    Smokeless tobacco: Never   Vaping Use    Vaping Use: Every day    Substances: Nicotine    Devices: Refillable tank   Substance and Sexual Activity    Alcohol use: Not Currently    Drug use: Not Currently     Comment: hx of narcotic pain medication SOBER SINCE 2022    Sexual activity: Defer      Family History   Problem Relation Age of Onset    Malig Hyperthermia Neg Hx         The following portions of the patient's history were reviewed and updated as appropriate: allergies, current medications, past family history, past medical history, past social history, past surgical history and problem list.    Review of Systems    Objective   Vitals:    24 1043   BP: 130/88   Pulse: 88   Resp: 14   Temp: 98.1 °F (36.7 °C)   SpO2: 97%     Body mass index is 28.58 kg/m².    Physical Exam  Vitals and nursing note reviewed.   Constitutional:       Appearance: He is well-developed.   HENT:      Head: Normocephalic and atraumatic.      Right Ear: External ear normal.      Left Ear: External ear normal.   Eyes:      Conjunctiva/sclera: Conjunctivae normal.   Neck:      Thyroid: No thyroid mass or thyromegaly.      Vascular: No carotid bruit.      Trachea: No tracheal deviation.   Cardiovascular:      Rate and Rhythm: Normal rate and regular rhythm.      Heart sounds: Normal heart sounds.   Pulmonary:      Effort: Pulmonary effort is  normal.      Breath sounds: Normal breath sounds.   Skin:     General: Skin is warm and dry.   Neurological:      Mental Status: He is alert and oriented to person, place, and time.      Gait: Gait normal.   Psychiatric:         Behavior: Behavior normal.         Thought Content: Thought content normal.         Assessment & Plan   Diagnoses and all orders for this visit:    1. Uncontrolled type 2 diabetes mellitus with hyperglycemia (Primary)  -     Hepatitis C Antibody  -     Microalbumin / Creatinine Urine Ratio - Urine, Clean Catch  -     CBC & Differential  -     Comprehensive Metabolic Panel  -     Hemoglobin A1c  -     CK  -     Lipid Panel With LDL / HDL Ratio  -     Iron and TIBC  -     Ferritin  -     Vitamin B12 & Folate  -     Vitamin D,25-Hydroxy  -     TSH  -     T4, free  -     T3, Free  -     Uric Acid  -     Urinalysis With Culture If Indicated -  -     Hepatitis B Surface Antigen  -     HBV Quant PCR Rfx to Genotype  -     Ambulatory Referral to ENT (Otolaryngology)  -     CT Chest Without Contrast; Future  -     Ambulatory Referral to Gastroenterology  -     metFORMIN (GLUCOPHAGE) 500 MG tablet; Take 2 tablets by mouth 2 (Two) Times a Day With Meals.  Dispense: 120 tablet; Refill: 0  -     glucose blood (OneTouch Verio) test strip; Check glucose 3 x daily  Dispense: 100 each; Refill: 0  -     Lancets (onetouch ultrasoft) lancets; Check glucose 3 x daily for hyperglycemia  Dispense: 100 each; Refill: 0  -     Blood Glucose Monitoring Suppl (OneTouch Verio Reflect) w/Device kit; Use 1 each Daily.  Dispense: 1 kit; Refill: 0  -     Microscopic Examination -    2. Gout, unspecified cause, unspecified chronicity, unspecified site  -     Uric Acid    3. Spitting up blood  -     Ambulatory Referral to ENT (Otolaryngology)  -     CT Chest Without Contrast; Future  -     Ambulatory Referral to Gastroenterology    4. Gastroesophageal reflux disease, unspecified whether esophagitis present  -     Ambulatory  "Referral to Gastroenterology    5. History of hepatitis B  -     Hepatitis B Surface Antigen  -     HBV Quant PCR Rfx to Genotype    6. Need for hepatitis C screening test  -     Hepatitis C Antibody    7. Encounter for screening for malignant neoplasm of colon        Assessment and Plan  Patient is establishing care after hospital visit after feeling \"woozy\" and light-headed and checking glucose at home at 511.  He was started on Metformin and is establishing care due to no PCP in years. Patient will have fasting labs. Call if no results in 1 week. Stability of conditions, plan, follow up, and further recommendations pending labs. Follow up in 2 weeks for review of records, glucose log, and further evaluation.     Uncontrolled, neglected diabetes mellitus with hyperglycemia- He is a poor historian but has not seen PCP in years. He was told prediabetic in ER 2018 but reports he was given Metformin and Insulin. He reports taking for a couple years then stopping without direction of follow up. He then started to feel light-headed and also had polyuria, polydipsia, and some nasal congestion. Glucose was noted to bee 511 and he went to ER. A1C was 15.1%. He was started on Metformin 500 mg twice daily and discharged from ER to see us.  I will check additional labs and make further recommendations. He will likely need additional medication. We will also need to ensure he updates diabetic eye and foot exams and I will give glucometer to monitor glucose fasting in the morning, 2 hours after largest meal, and bedtime. To submit log in the next 3-7 days.   History of gout- He has possible history of gout. I will check uric acid with labs. No symptoms today. To be seen if recurrence of symptoms.   GERD, spitting up blood- He reports having very uncontrolled. GERD symptoms but also reports \"spitting up\" blood that he is unsure if he is coughing up, is coming from uncontrolled GERD, or if this is from drainage from sinuses. He " is a very poor historian and initially reported symptoms to get advice for a friend then admitted symptoms were from him. I will check labs, refer for CT chest, to GI to consider EGD and colonoscopy, and to ENT for evaluation of sinuses and consideration of laryngoscope. For now, I will start Protonix 40 mg daily. To ER ASAP if bleeding.   Possible history of hepatitis B- He is a poor historian but may have had history of hepatitis B and hospitalization in the past. I asked that he sign a release for hospital records and I will check hepatitis B and C testing. Of note, he has Methadone on medication list- it is unclear the history of drug use or possible risk factors for hepatitis. I will discuss this more at follow up with review of records.     I spent 35 minutes caring for Guilherme Garcia on this date of service. This time includes time spent by me in the following activities as necessary: preparing for the visit, reviewing tests, specialists records and previous visits, obtaining and/or reviewing a separately obtained history, performing a medically appropriate exam and/or evaluation, counseling and educating the patient, family, caregiver, referring and/or communicating with other healthcare professionals, documenting information in the medical record, independently interpreting results and communicating that information with the patient, family, caregiver, and developing a medically appropriate treatment plan with consideration of other conditions, medications, and treatments.

## 2024-02-12 ENCOUNTER — TELEPHONE (OUTPATIENT)
Dept: GASTROENTEROLOGY | Facility: CLINIC | Age: 44
End: 2024-02-12
Payer: COMMERCIAL

## 2024-02-12 ENCOUNTER — OFFICE VISIT (OUTPATIENT)
Dept: GASTROENTEROLOGY | Facility: CLINIC | Age: 44
End: 2024-02-12
Payer: COMMERCIAL

## 2024-02-12 VITALS
SYSTOLIC BLOOD PRESSURE: 108 MMHG | HEIGHT: 69 IN | TEMPERATURE: 97.8 F | HEART RATE: 80 BPM | BODY MASS INDEX: 28.14 KG/M2 | WEIGHT: 190 LBS | DIASTOLIC BLOOD PRESSURE: 73 MMHG

## 2024-02-12 DIAGNOSIS — R13.10 ODYNOPHAGIA: ICD-10-CM

## 2024-02-12 DIAGNOSIS — R79.89 ELEVATED LIVER FUNCTION TESTS: ICD-10-CM

## 2024-02-12 DIAGNOSIS — R09.A2 GLOBUS SENSATION: ICD-10-CM

## 2024-02-12 DIAGNOSIS — R10.13 DYSPEPSIA: Primary | ICD-10-CM

## 2024-02-12 PROCEDURE — 1160F RVW MEDS BY RX/DR IN RCRD: CPT | Performed by: NURSE PRACTITIONER

## 2024-02-12 PROCEDURE — 1159F MED LIST DOCD IN RCRD: CPT | Performed by: NURSE PRACTITIONER

## 2024-02-12 PROCEDURE — 99204 OFFICE O/P NEW MOD 45 MIN: CPT | Performed by: NURSE PRACTITIONER

## 2024-02-12 RX ORDER — PANTOPRAZOLE SODIUM 40 MG/1
40 TABLET, DELAYED RELEASE ORAL DAILY
Qty: 90 TABLET | Refills: 3 | Status: SHIPPED | OUTPATIENT
Start: 2024-02-12

## 2024-02-12 NOTE — H&P (VIEW-ONLY)
Chief Complaint   Patient presents with    Dyspepsia       HPI    Guilherme Garcia is a  44 y.o. male here with a with a recent diagnosis of diabetes to establish care as a new patient for complaints of dyspepsia.    This patient will also follow with Dr. Amaral.    On visit today reports several months of dyspeptic symptoms, globus sensation, odynophagia and morning awakenings with the taste of blood in his mouth.  He has been taking Tums with variable improvement.  No nausea, vomiting, poor appetite or weight loss.  Denies early satiety.  He was recently diagnosed with type 2 diabetes with a A1c of 15 on initial diagnosis.  He recently started on metformin and is making major dietary changes.  He has yet to see a nutritionist.    No diarrhea, constipation or rectal bleeding.  Based on his age he is due for screening colonoscopy.  No family history of colon cancer.    Recent lab work with normal hemoglobin and mildly elevated LFTs.    Past Medical History:   Diagnosis Date    Diabetes mellitus        No past surgical history on file.    Scheduled Meds:     Continuous Infusions: No current facility-administered medications for this visit.      PRN Meds:     No Known Allergies    Social History     Socioeconomic History    Marital status:    Tobacco Use    Smoking status: Former     Packs/day: 1.00     Years: 27.00     Additional pack years: 0.00     Total pack years: 27.00     Types: Cigarettes     Start date:      Quit date: 2018     Years since quittin.1    Smokeless tobacco: Never   Vaping Use    Vaping Use: Every day    Substances: Nicotine    Devices: Refillable tank   Substance and Sexual Activity    Alcohol use: Not Currently    Drug use: Not Currently     Comment: hx of narcotic pain medication    Sexual activity: Defer       History reviewed. No pertinent family history.    Review of Systems   Gastrointestinal:  Negative for abdominal distention, abdominal pain, anal bleeding, blood in stool,  constipation, diarrhea, nausea, rectal pain and vomiting.       Vitals:    02/12/24 0946   BP: 108/73   Pulse: 80   Temp: 97.8 °F (36.6 °C)       Physical Exam  Constitutional:       Appearance: He is well-developed.   Abdominal:      General: Bowel sounds are normal. There is no distension.      Palpations: Abdomen is soft. There is no mass.      Tenderness: There is no abdominal tenderness. There is no guarding.      Hernia: No hernia is present.   Skin:     General: Skin is warm and dry.      Capillary Refill: Capillary refill takes less than 2 seconds.   Neurological:      Mental Status: He is alert and oriented to person, place, and time.   Psychiatric:         Behavior: Behavior normal.     Assessment    Diagnoses and all orders for this visit:    1. Dyspepsia (Primary)  -     Case Request; Standing  -     Obtain Informed Consent; Standing  -     Prepare and Witness Surgical Consent Form; Standing  -     Case Request    2. Odynophagia  -     Case Request; Standing  -     Obtain Informed Consent; Standing  -     Prepare and Witness Surgical Consent Form; Standing  -     Case Request    3. Globus sensation  -     Case Request; Standing  -     Obtain Informed Consent; Standing  -     Prepare and Witness Surgical Consent Form; Standing  -     Case Request    4. Elevated liver function tests  -     US Liver; Future    Other orders  -     pantoprazole (PROTONIX) 40 MG EC tablet; Take 1 tablet by mouth Daily.  Dispense: 90 tablet; Refill: 3       Plan    Schedule EGD with Dr. Amaral for further evaluation of symptoms offered screening colonoscopy as well but patient defers for now would like to think it over.  Risk-benefit of procedure reviewed the patient all questions were answered.  Informed the patient that colonoscopy is the screen for colon polyp/colon cancer and he is aware of potential missed lesion even in his cancer if he delays too long.  He is made an informed decision and prefers to move forward with EGD  only for now.    Begin Protonix 40 mg once daily in the interim.Antireflux measures and dietary modifications reviewed. Low acid diet reviewed. Keep head of bed elevated. Stop eating/drinking at least 3 hours prior to bedtime. Eliminate caffeine and carbonated beverages.  Weight loss encouraged if BMI over 25.  Recommend he see a nutritionist for diabetes counseling.  Continue to work on glycemic control along with dietary and lifestyle modifications.    Check liver ultrasound for mildly elevated LFTs.  Further recommendations and follow-up pending workup.          TODD Smith  [facility]  [address] [place] [address]  Office: [phone]

## 2024-02-14 LAB
25(OH)D3+25(OH)D2 SERPL-MCNC: 17.1 NG/ML (ref 30–100)
ALBUMIN SERPL-MCNC: 4.5 G/DL (ref 4.1–5.1)
ALBUMIN/CREAT UR: <2 MG/G CREAT (ref 0–29)
ALBUMIN/GLOB SERPL: 1.6 {RATIO} (ref 1.2–2.2)
ALP SERPL-CCNC: 59 IU/L (ref 44–121)
ALT SERPL-CCNC: 64 IU/L (ref 0–44)
APPEARANCE UR: CLEAR
AST SERPL-CCNC: 37 IU/L (ref 0–40)
BACTERIA #/AREA URNS HPF: NORMAL /[HPF]
BASOPHILS # BLD AUTO: 0 X10E3/UL (ref 0–0.2)
BASOPHILS NFR BLD AUTO: 0 %
BILIRUB SERPL-MCNC: 0.3 MG/DL (ref 0–1.2)
BILIRUB UR QL STRIP: NEGATIVE
BUN SERPL-MCNC: 9 MG/DL (ref 6–24)
BUN/CREAT SERPL: 9 (ref 9–20)
CALCIUM SERPL-MCNC: 9.8 MG/DL (ref 8.7–10.2)
CASTS URNS QL MICRO: NORMAL /LPF
CHLORIDE SERPL-SCNC: 97 MMOL/L (ref 96–106)
CHOLEST SERPL-MCNC: 209 MG/DL (ref 100–199)
CK SERPL-CCNC: 60 U/L (ref 49–439)
CO2 SERPL-SCNC: 24 MMOL/L (ref 20–29)
COLOR UR: YELLOW
CREAT SERPL-MCNC: 0.97 MG/DL (ref 0.76–1.27)
CREAT UR-MCNC: 122.7 MG/DL
EGFRCR SERPLBLD CKD-EPI 2021: 98 ML/MIN/1.73
EOSINOPHIL # BLD AUTO: 0.1 X10E3/UL (ref 0–0.4)
EOSINOPHIL NFR BLD AUTO: 2 %
EPI CELLS #/AREA URNS HPF: NORMAL /HPF (ref 0–10)
ERYTHROCYTE [DISTWIDTH] IN BLOOD BY AUTOMATED COUNT: 13.4 % (ref 11.6–15.4)
FERRITIN SERPL-MCNC: 284 NG/ML (ref 30–400)
FOLATE SERPL-MCNC: 14.2 NG/ML
GLOBULIN SER CALC-MCNC: 2.9 G/DL (ref 1.5–4.5)
GLUCOSE SERPL-MCNC: 179 MG/DL (ref 70–99)
GLUCOSE UR QL STRIP: NEGATIVE
HBA1C MFR BLD: 12.7 % (ref 4.8–5.6)
HBV DNA SERPL NAA+PROBE-ACNC: NORMAL IU/ML
HBV DNA SERPL NAA+PROBE-LOG IU: NORMAL LOG10 IU/ML
HBV GENTYP SERPL NAA+PROBE: NORMAL
HBV SURFACE AG SERPL QL IA: NEGATIVE
HCT VFR BLD AUTO: 47 % (ref 37.5–51)
HCV IGG SERPL QL IA: NON REACTIVE
HDLC SERPL-MCNC: 34 MG/DL
HGB BLD-MCNC: 15.8 G/DL (ref 13–17.7)
HGB UR QL STRIP: NEGATIVE
IMM GRANULOCYTES # BLD AUTO: 0 X10E3/UL (ref 0–0.1)
IMM GRANULOCYTES NFR BLD AUTO: 0 %
IRON SATN MFR SERPL: 24 % (ref 15–55)
IRON SERPL-MCNC: 78 UG/DL (ref 38–169)
KETONES UR QL STRIP: NEGATIVE
LDLC SERPL CALC-MCNC: 125 MG/DL (ref 0–99)
LDLC/HDLC SERPL: 3.7 RATIO (ref 0–3.6)
LEUKOCYTE ESTERASE UR QL STRIP: NEGATIVE
LYMPHOCYTES # BLD AUTO: 1.9 X10E3/UL (ref 0.7–3.1)
LYMPHOCYTES NFR BLD AUTO: 36 %
MCH RBC QN AUTO: 30.9 PG (ref 26.6–33)
MCHC RBC AUTO-ENTMCNC: 33.6 G/DL (ref 31.5–35.7)
MCV RBC AUTO: 92 FL (ref 79–97)
MICRO URNS: NORMAL
MICRO URNS: NORMAL
MICROALBUMIN UR-MCNC: <3 UG/ML
MONOCYTES # BLD AUTO: 0.4 X10E3/UL (ref 0.1–0.9)
MONOCYTES NFR BLD AUTO: 7 %
NEUTROPHILS # BLD AUTO: 2.9 X10E3/UL (ref 1.4–7)
NEUTROPHILS NFR BLD AUTO: 55 %
NITRITE UR QL STRIP: NEGATIVE
PH UR STRIP: 7.5 [PH] (ref 5–7.5)
PLATELET # BLD AUTO: 239 X10E3/UL (ref 150–450)
POTASSIUM SERPL-SCNC: 4.4 MMOL/L (ref 3.5–5.2)
PROT SERPL-MCNC: 7.4 G/DL (ref 6–8.5)
PROT UR QL STRIP: NEGATIVE
RBC # BLD AUTO: 5.12 X10E6/UL (ref 4.14–5.8)
RBC #/AREA URNS HPF: NORMAL /HPF (ref 0–2)
REF LAB TEST REF RANGE: NORMAL
SODIUM SERPL-SCNC: 138 MMOL/L (ref 134–144)
SP GR UR STRIP: 1.02 (ref 1–1.03)
T3FREE SERPL-MCNC: 3.2 PG/ML (ref 2–4.4)
T4 FREE SERPL-MCNC: 1.2 NG/DL (ref 0.82–1.77)
TIBC SERPL-MCNC: 321 UG/DL (ref 250–450)
TRIGL SERPL-MCNC: 281 MG/DL (ref 0–149)
TSH SERPL DL<=0.005 MIU/L-ACNC: 1.68 UIU/ML (ref 0.45–4.5)
UIBC SERPL-MCNC: 243 UG/DL (ref 111–343)
URATE SERPL-MCNC: 5.5 MG/DL (ref 3.8–8.4)
URINALYSIS REFLEX: NORMAL
UROBILINOGEN UR STRIP-MCNC: 0.2 MG/DL (ref 0.2–1)
VIT B12 SERPL-MCNC: 733 PG/ML (ref 232–1245)
VLDLC SERPL CALC-MCNC: 50 MG/DL (ref 5–40)
WBC # BLD AUTO: 5.3 X10E3/UL (ref 3.4–10.8)
WBC #/AREA URNS HPF: NORMAL /HPF (ref 0–5)

## 2024-02-16 ENCOUNTER — ANESTHESIA (OUTPATIENT)
Dept: GASTROENTEROLOGY | Facility: HOSPITAL | Age: 44
End: 2024-02-16
Payer: COMMERCIAL

## 2024-02-16 ENCOUNTER — HOSPITAL ENCOUNTER (OUTPATIENT)
Facility: HOSPITAL | Age: 44
Setting detail: HOSPITAL OUTPATIENT SURGERY
Discharge: HOME | End: 2024-02-16
Attending: INTERNAL MEDICINE | Admitting: INTERNAL MEDICINE
Payer: COMMERCIAL

## 2024-02-16 ENCOUNTER — ANESTHESIA EVENT (OUTPATIENT)
Dept: GASTROENTEROLOGY | Facility: HOSPITAL | Age: 44
End: 2024-02-16
Payer: COMMERCIAL

## 2024-02-16 VITALS
OXYGEN SATURATION: 98 % | HEIGHT: 69 IN | SYSTOLIC BLOOD PRESSURE: 119 MMHG | DIASTOLIC BLOOD PRESSURE: 84 MMHG | BODY MASS INDEX: 27.25 KG/M2 | RESPIRATION RATE: 17 BRPM | HEART RATE: 57 BPM | WEIGHT: 184 LBS

## 2024-02-16 DIAGNOSIS — R09.A2 GLOBUS SENSATION: ICD-10-CM

## 2024-02-16 DIAGNOSIS — R10.13 DYSPEPSIA: ICD-10-CM

## 2024-02-16 DIAGNOSIS — R13.10 ODYNOPHAGIA: ICD-10-CM

## 2024-02-16 LAB — GLUCOSE BLDC GLUCOMTR-MCNC: 141 MG/DL (ref 70–130)

## 2024-02-16 PROCEDURE — 88305 TISSUE EXAM BY PATHOLOGIST: CPT | Performed by: INTERNAL MEDICINE

## 2024-02-16 PROCEDURE — 82948 REAGENT STRIP/BLOOD GLUCOSE: CPT

## 2024-02-16 PROCEDURE — 43239 EGD BIOPSY SINGLE/MULTIPLE: CPT | Performed by: INTERNAL MEDICINE

## 2024-02-16 PROCEDURE — 25010000002 PROPOFOL 10 MG/ML EMULSION

## 2024-02-16 PROCEDURE — 25810000003 LACTATED RINGERS PER 1000 ML

## 2024-02-16 RX ORDER — SODIUM CHLORIDE, SODIUM LACTATE, POTASSIUM CHLORIDE, CALCIUM CHLORIDE 600; 310; 30; 20 MG/100ML; MG/100ML; MG/100ML; MG/100ML
INJECTION, SOLUTION INTRAVENOUS CONTINUOUS PRN
Status: DISCONTINUED | OUTPATIENT
Start: 2024-02-16 | End: 2024-02-16 | Stop reason: SURG

## 2024-02-16 RX ORDER — LIDOCAINE HYDROCHLORIDE 20 MG/ML
INJECTION, SOLUTION INFILTRATION; PERINEURAL AS NEEDED
Status: DISCONTINUED | OUTPATIENT
Start: 2024-02-16 | End: 2024-02-16 | Stop reason: SURG

## 2024-02-16 RX ORDER — PROPOFOL 10 MG/ML
VIAL (ML) INTRAVENOUS CONTINUOUS PRN
Status: DISCONTINUED | OUTPATIENT
Start: 2024-02-16 | End: 2024-02-16 | Stop reason: SURG

## 2024-02-16 RX ADMIN — LIDOCAINE HYDROCHLORIDE 60 MG: 20 INJECTION, SOLUTION INFILTRATION; PERINEURAL at 13:32

## 2024-02-16 RX ADMIN — SODIUM CHLORIDE, POTASSIUM CHLORIDE, SODIUM LACTATE AND CALCIUM CHLORIDE: 600; 310; 30; 20 INJECTION, SOLUTION INTRAVENOUS at 13:23

## 2024-02-16 RX ADMIN — PROPOFOL 140 MCG/KG/MIN: 10 INJECTION, EMULSION INTRAVENOUS at 13:33

## 2024-02-16 RX ADMIN — PROPOFOL 100 MG: 10 INJECTION, EMULSION INTRAVENOUS at 13:32

## 2024-02-16 NOTE — BRIEF OP NOTE
ESOPHAGOGASTRODUODENOSCOPY  Progress Note    Guilherme Garcia  2/16/2024    Pre-op Diagnosis:   Dyspepsia [R10.13]  Odynophagia [R13.10]  Globus sensation [R09.A2]       Post-Op Diagnosis Codes:     * Dyspepsia [R10.13]     * Odynophagia [R13.10]     * Globus sensation [R09.A2]     * Gastritis [K29.70]     * Hiatal hernia [K44.9]     * Erosive esophagitis [K22.10]    Procedure/CPT® Codes:        Procedure(s):  ESOPHAGOGASTRODUODENOSCOPY with cold biopsies              Surgeon(s):  Elijah Amaral MD    Anesthesia: Monitored Anesthesia Care    Staff:   Endo Technician: Oralia Skaggs  Endo Nurse: Radha Lindsey RN         Estimated Blood Loss: minimal    Urine Voided: * No values recorded between 2/16/2024  1:23 PM and 2/16/2024  1:43 PM *    Specimens:                Specimens       ID Source Type Tests Collected By Collected At Frozen?    A Gastric, Antrum Tissue TISSUE PATHOLOGY EXAM   Elijah Amaral MD 2/16/24 9440     Description: antrum tissue biopsies    B Gastric, Body Tissue TISSUE PATHOLOGY EXAM   Elijah Amaral MD 2/16/24 1340                   Drains: * No LDAs found *    Findings: EGD with biopsy revealed normal duodenal mucosa throughout.  Mild antral gastritis seen, with hiatal hernia noted on retroflexed view the GE junction.  Erosive esophagitis with several superficial ulcerations in the distal esophagus noted biopsies obtained to rule out Guzman's esophagus.        Complications: None          Elijah Amaral MD     Date: 2/16/2024  Time: 13:43 EST

## 2024-02-16 NOTE — DISCHARGE INSTRUCTIONS

## 2024-02-19 LAB
LAB AP CASE REPORT: NORMAL
LAB AP DIAGNOSIS COMMENT: NORMAL
PATH REPORT.FINAL DX SPEC: NORMAL
PATH REPORT.GROSS SPEC: NORMAL

## 2024-02-26 ENCOUNTER — APPOINTMENT (OUTPATIENT)
Dept: CT IMAGING | Facility: HOSPITAL | Age: 44
End: 2024-02-26
Payer: COMMERCIAL

## 2024-02-26 ENCOUNTER — APPOINTMENT (OUTPATIENT)
Dept: GENERAL RADIOLOGY | Facility: HOSPITAL | Age: 44
End: 2024-02-26
Payer: COMMERCIAL

## 2024-02-26 ENCOUNTER — HOSPITAL ENCOUNTER (EMERGENCY)
Facility: HOSPITAL | Age: 44
Discharge: HOME | End: 2024-02-26
Attending: EMERGENCY MEDICINE | Admitting: EMERGENCY MEDICINE
Payer: COMMERCIAL

## 2024-02-26 VITALS
TEMPERATURE: 98 F | OXYGEN SATURATION: 100 % | SYSTOLIC BLOOD PRESSURE: 152 MMHG | HEART RATE: 91 BPM | HEIGHT: 69 IN | DIASTOLIC BLOOD PRESSURE: 116 MMHG | WEIGHT: 190 LBS | RESPIRATION RATE: 16 BRPM | BODY MASS INDEX: 28.14 KG/M2

## 2024-02-26 DIAGNOSIS — Z86.39 HISTORY OF TYPE 2 DIABETES MELLITUS: ICD-10-CM

## 2024-02-26 DIAGNOSIS — F41.8 ANXIETY ABOUT HEALTH: Primary | ICD-10-CM

## 2024-02-26 LAB
ALBUMIN SERPL-MCNC: 4.7 G/DL (ref 3.5–5.2)
ALBUMIN/GLOB SERPL: 1.6 G/DL
ALP SERPL-CCNC: 54 U/L (ref 39–117)
ALT SERPL W P-5'-P-CCNC: 49 U/L (ref 1–41)
AMPHET+METHAMPHET UR QL: NEGATIVE
ANION GAP SERPL CALCULATED.3IONS-SCNC: 9 MMOL/L (ref 5–15)
AST SERPL-CCNC: 24 U/L (ref 1–40)
ATMOSPHERIC PRESS: 745.4 MMHG
B-OH-BUTYR SERPL-SCNC: 0.16 MMOL/L (ref 0.02–0.27)
BARBITURATES UR QL SCN: NEGATIVE
BASE EXCESS BLDV CALC-SCNC: -2.6 MMOL/L (ref -2–2)
BASOPHILS # BLD AUTO: 0.02 10*3/MM3 (ref 0–0.2)
BASOPHILS NFR BLD AUTO: 0.3 % (ref 0–1.5)
BENZODIAZ UR QL SCN: NEGATIVE
BILIRUB SERPL-MCNC: 0.3 MG/DL (ref 0–1.2)
BILIRUB UR QL STRIP: NEGATIVE
BUN SERPL-MCNC: 11 MG/DL (ref 6–20)
BUN/CREAT SERPL: 11.6 (ref 7–25)
CALCIUM SPEC-SCNC: 9.6 MG/DL (ref 8.6–10.5)
CANNABINOIDS SERPL QL: NEGATIVE
CHLORIDE SERPL-SCNC: 105 MMOL/L (ref 98–107)
CLARITY UR: CLEAR
CO2 BLDA-SCNC: 22.1 MMOL/L (ref 23–27)
CO2 SERPL-SCNC: 27 MMOL/L (ref 22–29)
COCAINE UR QL: NEGATIVE
COLOR UR: YELLOW
CREAT SERPL-MCNC: 0.95 MG/DL (ref 0.76–1.27)
DEPRECATED RDW RBC AUTO: 44.9 FL (ref 37–54)
DEVICE COMMENT: ABNORMAL
EGFRCR SERPLBLD CKD-EPI 2021: 100 ML/MIN/1.73
EOSINOPHIL # BLD AUTO: 0.01 10*3/MM3 (ref 0–0.4)
EOSINOPHIL NFR BLD AUTO: 0.2 % (ref 0.3–6.2)
ERYTHROCYTE [DISTWIDTH] IN BLOOD BY AUTOMATED COUNT: 13.6 % (ref 12.3–15.4)
ETHANOL BLD-MCNC: <10 MG/DL (ref 0–10)
ETHANOL UR QL: <0.01 %
FENTANYL UR-MCNC: NEGATIVE NG/ML
GLOBULIN UR ELPH-MCNC: 2.9 GM/DL
GLUCOSE BLDC GLUCOMTR-MCNC: 201 MG/DL (ref 70–130)
GLUCOSE SERPL-MCNC: 134 MG/DL (ref 65–99)
GLUCOSE UR STRIP-MCNC: NEGATIVE MG/DL
HCO3 BLDV-SCNC: 21.1 MMOL/L (ref 22–28)
HCT VFR BLD AUTO: 41.9 % (ref 37.5–51)
HGB BLD-MCNC: 14.3 G/DL (ref 13–17.7)
HGB UR QL STRIP.AUTO: NEGATIVE
IMM GRANULOCYTES # BLD AUTO: 0.01 10*3/MM3 (ref 0–0.05)
IMM GRANULOCYTES NFR BLD AUTO: 0.2 % (ref 0–0.5)
KETONES UR QL STRIP: NEGATIVE
LEUKOCYTE ESTERASE UR QL STRIP.AUTO: NEGATIVE
LIPASE SERPL-CCNC: 27 U/L (ref 13–60)
LYMPHOCYTES # BLD AUTO: 1.31 10*3/MM3 (ref 0.7–3.1)
LYMPHOCYTES NFR BLD AUTO: 20.9 % (ref 19.6–45.3)
MAGNESIUM SERPL-MCNC: 2.2 MG/DL (ref 1.6–2.6)
MCH RBC QN AUTO: 31.2 PG (ref 26.6–33)
MCHC RBC AUTO-ENTMCNC: 34.1 G/DL (ref 31.5–35.7)
MCV RBC AUTO: 91.3 FL (ref 79–97)
METHADONE UR QL SCN: POSITIVE
MODALITY: ABNORMAL
MONOCYTES # BLD AUTO: 0.31 10*3/MM3 (ref 0.1–0.9)
MONOCYTES NFR BLD AUTO: 4.9 % (ref 5–12)
NEUTROPHILS NFR BLD AUTO: 4.62 10*3/MM3 (ref 1.7–7)
NEUTROPHILS NFR BLD AUTO: 73.5 % (ref 42.7–76)
NITRITE UR QL STRIP: NEGATIVE
NRBC BLD AUTO-RTO: 0 /100 WBC (ref 0–0.2)
OPIATES UR QL: NEGATIVE
OXYCODONE UR QL SCN: NEGATIVE
PCO2 BLDV: 32.4 MM HG (ref 41–51)
PH BLDV: 7.42 PH UNITS (ref 7.31–7.41)
PH UR STRIP.AUTO: 7.5 [PH] (ref 5–8)
PLATELET # BLD AUTO: 207 10*3/MM3 (ref 140–450)
PMV BLD AUTO: 9.6 FL (ref 6–12)
PO2 BLDV: 91.7 MM HG (ref 35–45)
POTASSIUM SERPL-SCNC: 4 MMOL/L (ref 3.5–5.2)
PROT SERPL-MCNC: 7.6 G/DL (ref 6–8.5)
PROT UR QL STRIP: NEGATIVE
QT INTERVAL: 420 MS
QTC INTERVAL: 466 MS
RBC # BLD AUTO: 4.59 10*6/MM3 (ref 4.14–5.8)
SAO2 % BLDCOV: 97.4 % (ref 45–75)
SODIUM SERPL-SCNC: 141 MMOL/L (ref 136–145)
SP GR UR STRIP: <=1.005 (ref 1–1.03)
T4 FREE SERPL-MCNC: 1.21 NG/DL (ref 0.93–1.7)
TOTAL RATE: 30 BREATHS/MINUTE
TROPONIN T SERPL HS-MCNC: 8 NG/L
TSH SERPL DL<=0.05 MIU/L-ACNC: 1.3 UIU/ML (ref 0.27–4.2)
UROBILINOGEN UR QL STRIP: NORMAL
WBC NRBC COR # BLD AUTO: 6.28 10*3/MM3 (ref 3.4–10.8)

## 2024-02-26 PROCEDURE — 83690 ASSAY OF LIPASE: CPT | Performed by: EMERGENCY MEDICINE

## 2024-02-26 PROCEDURE — 25810000003 SODIUM CHLORIDE 0.9 % SOLUTION: Performed by: EMERGENCY MEDICINE

## 2024-02-26 PROCEDURE — 81003 URINALYSIS AUTO W/O SCOPE: CPT | Performed by: EMERGENCY MEDICINE

## 2024-02-26 PROCEDURE — 84439 ASSAY OF FREE THYROXINE: CPT | Performed by: EMERGENCY MEDICINE

## 2024-02-26 PROCEDURE — 82803 BLOOD GASES ANY COMBINATION: CPT | Performed by: EMERGENCY MEDICINE

## 2024-02-26 PROCEDURE — 80307 DRUG TEST PRSMV CHEM ANLYZR: CPT | Performed by: EMERGENCY MEDICINE

## 2024-02-26 PROCEDURE — 82948 REAGENT STRIP/BLOOD GLUCOSE: CPT

## 2024-02-26 PROCEDURE — 84443 ASSAY THYROID STIM HORMONE: CPT | Performed by: EMERGENCY MEDICINE

## 2024-02-26 PROCEDURE — 82077 ASSAY SPEC XCP UR&BREATH IA: CPT | Performed by: EMERGENCY MEDICINE

## 2024-02-26 PROCEDURE — 85025 COMPLETE CBC W/AUTO DIFF WBC: CPT | Performed by: EMERGENCY MEDICINE

## 2024-02-26 PROCEDURE — 93005 ELECTROCARDIOGRAM TRACING: CPT | Performed by: EMERGENCY MEDICINE

## 2024-02-26 PROCEDURE — 83735 ASSAY OF MAGNESIUM: CPT | Performed by: EMERGENCY MEDICINE

## 2024-02-26 PROCEDURE — 82010 KETONE BODYS QUAN: CPT | Performed by: EMERGENCY MEDICINE

## 2024-02-26 PROCEDURE — 99284 EMERGENCY DEPT VISIT MOD MDM: CPT

## 2024-02-26 PROCEDURE — 84484 ASSAY OF TROPONIN QUANT: CPT | Performed by: EMERGENCY MEDICINE

## 2024-02-26 PROCEDURE — 93010 ELECTROCARDIOGRAM REPORT: CPT | Performed by: INTERNAL MEDICINE

## 2024-02-26 PROCEDURE — 71045 X-RAY EXAM CHEST 1 VIEW: CPT

## 2024-02-26 PROCEDURE — 70450 CT HEAD/BRAIN W/O DYE: CPT

## 2024-02-26 PROCEDURE — 80053 COMPREHEN METABOLIC PANEL: CPT | Performed by: EMERGENCY MEDICINE

## 2024-02-26 RX ORDER — HYDROXYZINE HYDROCHLORIDE 25 MG/1
25 TABLET, FILM COATED ORAL EVERY 6 HOURS PRN
Qty: 20 TABLET | Refills: 0 | Status: SHIPPED | OUTPATIENT
Start: 2024-02-26 | End: 2024-02-28 | Stop reason: SDUPTHER

## 2024-02-26 RX ADMIN — SODIUM CHLORIDE 1000 ML: 9 INJECTION, SOLUTION INTRAVENOUS at 12:19

## 2024-02-26 NOTE — ED PROVIDER NOTES
" EMERGENCY DEPARTMENT ENCOUNTER    Room Number:  37/37  Date seen:  2/26/2024  PCP: Michelle Cabral PA  Historian: Patient and friend      HPI:  Chief Complaint: Not feeling well  Context: Guilherme Garcia is a 44 y.o. male who presents to the ED c/o not feeling well today.  The patient states he is having heart palpitations.  The patient's blood sugar was high this morning but he states he does not know how high.  He states he is a diabetic but is not taking his insulin.  The patient is on methadone.  The patient's friend states that he had some slurred speech earlier today.  The patient tells me that his blood sugars have remained high despite taking the metformin and that he has been \"foggy headed\" ever since starting the metformin several weeks ago.  He states this has become progressively worse until this morning when he states he felt more confused.  Patient denies any focal deficits.  He denies taking any alcohol or illegal drugs.    PAST MEDICAL HISTORY  Active Ambulatory Problems     Diagnosis Date Noted    Dyspepsia 02/12/2024    Odynophagia 02/12/2024    Globus sensation 02/12/2024     Resolved Ambulatory Problems     Diagnosis Date Noted    No Resolved Ambulatory Problems     Past Medical History:   Diagnosis Date    Bleeding in mouth     Diabetes mellitus     Dry throat     GERD (gastroesophageal reflux disease)     Hepatitis B 2014    History of kidney stones          REVIEW OF SYSTEMS  All systems reviewed and negative except for those discussed in HPI.       PAST SURGICAL HISTORY  Past Surgical History:   Procedure Laterality Date    ENDOSCOPY N/A 2/16/2024    Procedure: ESOPHAGOGASTRODUODENOSCOPY with cold biopsies;  Surgeon: Elijah Amaral MD;  Location: [facility] ENDOSCOPY;  Service: Gastroenterology;  Laterality: N/A;  pre: dyspepsia, globus  post: esophagitis, gastritis, hiatal hernia    KIDNEY STONE SURGERY           FAMILY HISTORY  Family History   Problem Relation Age of Onset    Malig " Hyperthermia Neg Hx          SOCIAL HISTORY  Social History     Socioeconomic History    Marital status:    Tobacco Use    Smoking status: Former     Packs/day: 1.00     Years: 27.00     Additional pack years: 0.00     Total pack years: 27.00     Types: Cigarettes     Start date:      Quit date: 2018     Years since quittin.1    Smokeless tobacco: Never   Vaping Use    Vaping Use: Every day    Substances: Nicotine    Devices: Refillable tank   Substance and Sexual Activity    Alcohol use: Not Currently    Drug use: Not Currently     Comment: hx of narcotic pain medication SOBER SINCE 2022    Sexual activity: Defer         ALLERGIES  Patient has no known allergies.      PHYSICAL EXAM  ED Triage Vitals [24 1200]   Temp Heart Rate Resp BP SpO2   98 °F (36.7 °C) 91 16 -- 100 %      Temp src Heart Rate Source Patient Position BP Location FiO2 (%)   Tympanic Monitor -- -- --       Physical Exam      GENERAL: 44-year-old male in mild distress  HENT: NCAT: nares patent: Neck supple  EYES: no scleral icterus  CV: regular rhythm, normal rate  RESPIRATORY: normal effort  ABDOMEN: soft, NTND: Bowel sounds positive  MUSCULOSKELETAL: no deformity  NEURO: alert oriented x 3 with a normal neuroexam  PSYCH:  calm, cooperative  SKIN: warm, dry    Vital signs and nursing notes reviewed.      LAB RESULTS  Recent Results (from the past 24 hour(s))   ECG 12 Lead Tachycardia    Collection Time: 24 12:04 PM   Result Value Ref Range    QT Interval 420 ms    QTC Interval 466 ms   POC Glucose Once    Collection Time: 24 12:07 PM    Specimen: Blood   Result Value Ref Range    Glucose 201 (H) 70 - 130 mg/dL   Lipase    Collection Time: 24 12:18 PM    Specimen: Blood   Result Value Ref Range    Lipase 27 13 - 60 U/L   TSH    Collection Time: 24 12:18 PM    Specimen: Blood   Result Value Ref Range    TSH 1.300 0.270 - 4.200 uIU/mL   T4, Free    Collection Time: 24 12:18 PM    Specimen: Blood    Result Value Ref Range    Free T4 1.21 0.93 - 1.70 ng/dL   CBC Auto Differential    Collection Time: 02/26/24 12:18 PM    Specimen: Blood   Result Value Ref Range    WBC 6.28 3.40 - 10.80 10*3/mm3    RBC 4.59 4.14 - 5.80 10*6/mm3    Hemoglobin 14.3 13.0 - 17.7 g/dL    Hematocrit 41.9 37.5 - 51.0 %    MCV 91.3 79.0 - 97.0 fL    MCH 31.2 26.6 - 33.0 pg    MCHC 34.1 31.5 - 35.7 g/dL    RDW 13.6 12.3 - 15.4 %    RDW-SD 44.9 37.0 - 54.0 fl    MPV 9.6 6.0 - 12.0 fL    Platelets 207 140 - 450 10*3/mm3    Neutrophil % 73.5 42.7 - 76.0 %    Lymphocyte % 20.9 19.6 - 45.3 %    Monocyte % 4.9 (L) 5.0 - 12.0 %    Eosinophil % 0.2 (L) 0.3 - 6.2 %    Basophil % 0.3 0.0 - 1.5 %    Immature Grans % 0.2 0.0 - 0.5 %    Neutrophils, Absolute 4.62 1.70 - 7.00 10*3/mm3    Lymphocytes, Absolute 1.31 0.70 - 3.10 10*3/mm3    Monocytes, Absolute 0.31 0.10 - 0.90 10*3/mm3    Eosinophils, Absolute 0.01 0.00 - 0.40 10*3/mm3    Basophils, Absolute 0.02 0.00 - 0.20 10*3/mm3    Immature Grans, Absolute 0.01 0.00 - 0.05 10*3/mm3    nRBC 0.0 0.0 - 0.2 /100 WBC   Urinalysis With Microscopic If Indicated (No Culture) - Urine, Clean Catch    Collection Time: 02/26/24  1:01 PM    Specimen: Urine, Clean Catch   Result Value Ref Range    Color, UA Yellow Yellow, Straw    Appearance, UA Clear Clear    pH, UA 7.5 5.0 - 8.0    Specific Gravity, UA <=1.005 1.005 - 1.030    Glucose, UA Negative Negative    Ketones, UA Negative Negative    Bilirubin, UA Negative Negative    Blood, UA Negative Negative    Protein, UA Negative Negative    Leuk Esterase, UA Negative Negative    Nitrite, UA Negative Negative    Urobilinogen, UA 0.2 E.U./dL 0.2 - 1.0 E.U./dL   Urine Drug Screen - Urine, Clean Catch    Collection Time: 02/26/24  1:01 PM    Specimen: Urine, Clean Catch   Result Value Ref Range    Amphet/Methamphet, Screen Negative Negative    Barbiturates Screen, Urine Negative Negative    Benzodiazepine Screen, Urine Negative Negative    Cocaine Screen, Urine  Negative Negative    Opiate Screen Negative Negative    THC, Screen, Urine Negative Negative    Methadone Screen, Urine Positive (A) Negative    Oxycodone Screen, Urine Negative Negative    Fentanyl, Urine Negative Negative   Blood Gas, Venous -    Collection Time: 02/26/24  1:57 PM    Specimen: Venous Blood   Result Value Ref Range    pH, Venous 7.422 (H) 7.310 - 7.410 pH Units    pCO2, Venous 32.4 (L) 41.0 - 51.0 mm Hg    pO2, Venous 91.7 (H) 35.0 - 45.0 mm Hg    HCO3, Venous 21.1 (L) 22.0 - 28.0 mmol/L    Base Excess, Venous -2.6 (L) -2.0 - 2.0 mmol/L    O2 Saturation, Venous 97.4 (H) 45.0 - 75.0 %    CO2 Content 22.1 (L) 23 - 27 mmol/L    Barometric Pressure for Blood Gas 745.4000 mmHg    Modality Room Air     Rate 30 Breaths/minute    Device Comment [id number]    Comprehensive Metabolic Panel    Collection Time: 02/26/24  2:44 PM    Specimen: Blood   Result Value Ref Range    Glucose 134 (H) 65 - 99 mg/dL    BUN 11 6 - 20 mg/dL    Creatinine 0.95 0.76 - 1.27 mg/dL    Sodium 141 136 - 145 mmol/L    Potassium 4.0 3.5 - 5.2 mmol/L    Chloride 105 98 - 107 mmol/L    CO2 27.0 22.0 - 29.0 mmol/L    Calcium 9.6 8.6 - 10.5 mg/dL    Total Protein 7.6 6.0 - 8.5 g/dL    Albumin 4.7 3.5 - 5.2 g/dL    ALT (SGPT) 49 (H) 1 - 41 U/L    AST (SGOT) 24 1 - 40 U/L    Alkaline Phosphatase 54 39 - 117 U/L    Total Bilirubin 0.3 0.0 - 1.2 mg/dL    Globulin 2.9 gm/dL    A/G Ratio 1.6 g/dL    BUN/Creatinine Ratio 11.6 7.0 - 25.0    Anion Gap 9.0 5.0 - 15.0 mmol/L    eGFR 100.0 >60.0 mL/min/1.73   High Sensitivity Troponin T    Collection Time: 02/26/24  2:44 PM    Specimen: Blood   Result Value Ref Range    HS Troponin T 8 <22 ng/L   Ethanol    Collection Time: 02/26/24  2:44 PM    Specimen: Blood   Result Value Ref Range    Ethanol <10 0 - 10 mg/dL    Ethanol % <0.010 %   Magnesium    Collection Time: 02/26/24  2:44 PM    Specimen: Blood   Result Value Ref Range    Magnesium 2.2 1.6 - 2.6 mg/dL   Beta Hydroxybutyrate Quantitative     Collection Time: 02/26/24  2:44 PM    Specimen: Blood   Result Value Ref Range    Beta-Hydroxybutyrate Quant 0.158 0.020 - 0.270 mmol/L       Ordered the above labs and reviewed the results.        RADIOLOGY  CT Head Without Contrast    Result Date: 2/26/2024  CT HEAD WO CONTRAST-  INDICATIONS: Altered mental status  TECHNIQUE: Radiation dose reduction techniques were utilized, including automated exposure control and exposure modulation based on body size. Noncontrast head CT  COMPARISON: None available  FINDINGS:    No acute intracranial hemorrhage, midline shift or mass effect. No acute territorial infarct is identified.  Ventricles, cisterns, cerebral sulci are unremarkable for patient age.  The visualized paranasal sinuses, orbits, mastoid air cells are unremarkable.         No acute intracranial hemorrhage or hydrocephalus. If there is further clinical concern, MRI could be considered for further evaluation.  This report was finalized on 2/26/2024 1:45 PM by Dr. Vaughn Ashby M.D on Workstation: [id number]      XR Chest 1 View    Result Date: 2/26/2024  XR CHEST 1 VW-3/26/2024  HISTORY: Shortness of breath.  Heart size is within normal limits. Lungs appear free of acute infiltrates. Bones and soft tissues are unremarkable.      1. No acute process.   This report was finalized on 2/26/2024 1:28 PM by Dr. Isaac Oconnor M.D on Workstation: [id number]       Ordered the above noted radiological studies. Reviewed by me in PACS.            PROCEDURES  Procedures          MEDICATIONS GIVEN IN ER  Medications   sodium chloride 0.9 % bolus 1,000 mL (0 mL Intravenous Stopped 2/26/24 1357)             MEDICAL DECISION MAKING, PROGRESS, and CONSULTS    All labs have been independently reviewed by me.  All radiology studies have been reviewed by me and I have also reviewed the radiology report.   EKG's independently viewed and interpreted by me.  Discussion below represents my analysis of pertinent findings related to  patient's condition, differential diagnosis, treatment plan and final disposition.      Additional sources:    - External (non-ED) record review: I reviewed the patient's EGD from 2/16/2024.  His EGD was done by Dr. Amaral and had cold biopsies.  I also reviewed his office visit with his PCP on 2/7/2024 where he was seen for uncontrolled diabetes and hyperglycemia.  She states she had not seen a PCP in years and is noncompliant.  The patient was seen in the ER on 2/3.  Per their note the patient was supposed to be on metformin and insulin which he was not taking.  The patient is not in DKA so was discharged on metformin.  - Chronic or social conditions impacting care: Patient lives at home and has been on methadone for over a year for a previous problem with pain pills.  He states he has been clean and not using alcohol.    - Shared decision making: After shared decision-making discussion to myself and the patient we agree he is stable for discharge and outpatient follow-up      Orders placed during this visit:  Orders Placed This Encounter   Procedures    XR Chest 1 View    CT Head Without Contrast    Comprehensive Metabolic Panel    Lipase    Urinalysis With Microscopic If Indicated (No Culture) - Urine, Clean Catch    High Sensitivity Troponin T    Ethanol    Urine Drug Screen - Urine, Clean Catch    Magnesium    TSH    T4, Free    Blood Gas, Venous -    CBC Auto Differential    Beta Hydroxybutyrate Quantitative    High Sensitivity Troponin T 2Hr    Monitor Blood Pressure    Pulse Oximetry, Continuous    POC Glucose Once    POC Glucose Once    ECG 12 Lead Tachycardia    CBC & Differential    Ketone Bodies, Serum (Not performed at [facility])         Differential diagnosis:  My differential diagnosis includes but is not limited to stroke, encephalopathy, toxic / metabolic, hypoglycemia, toxin-induced, psychogenic, medication-induced, or infectious.      Independent interpretation of labs, radiology studies, and  discussions with consultants:  ED Course as of 02/26/24 1527   Mon Feb 26, 2024   1208 EKG    EKG time: 1204  Rhythm/Rate: Normal sinus rhythm at 74  Normal EKG  Normal ST segments  Normal intervals  No old EKG for comparison  Interpreted Contemporaneously by me.  Independently viewed by me     [GP]   1219 NIHSS:  0-->Alert: keenly responsive  0-->Answers both questions correctly  0-->Performs both tasks correctly  0=normal  0=No visual loss  0=Normal symmetric movement  0-->No drift: limb holds 90 (or 45) degrees for full 10 secs  0-->No drift: limb holds 90 (or 45) degrees for full 10 secs  0-->No drift: limb holds 90 (or 45) degrees for full 10 secs  0-->No drift: limb holds 90 (or 45) degrees for full 10 secs  0=Absent  0=Normal; no sensory loss  0=No aphasia, normal  0=Normal  0=No abnormality  Total score: 0 [GP]   1219 I will treat the patient with IV fluids while obtaining labs, urinalysis and CT scan for further evaluation. [GP]   1433 The lab just called again and stated the patient's blood is hemolyzed once again.   Thus this patient's care is being delayed because because lab states the patient's blood is hemolyzed.  Of note is the patient is not in DKA. [GP]   1433 My independent interpretation of patient's chest x-ray is no pneumonia or CHF [GP]   1433 The patient's head CT is negative acute [GP]   1519 The patient's workup here has been unremarkable.  His head CT, labs, urinalysis are unremarkable.  The patient has no signs of DKA or nonketotic hyperosmolar coma.  The patient is now anxious and asking for refills of his medications prior to seeing his PCP in 2 days. [GP]   1525 Examination the patient is alert and oriented x 3.  His blood pressure is 139/87.  The patient is extremely anxious and requesting anxiety medications.  He states he is actually talk to his PCP since being in the emergency room and has an appointment to see her in 48 hours.  I advised the patient that I will start him on  hydroxyzine and to continue on his current medication and follow-up with his PCP in 48 hours as scheduled.  The patient understands and agrees with the plan. [GP]      ED Course User Index  [GP] Vitaly Resendiz MD               DIAGNOSIS  Final diagnoses:   Anxiety about health   History of type 2 diabetes mellitus         DISPOSITION  Discharged            Latest Documented Vital Signs:  As of 15:27 EST  BP- 139/87  HR- 91 Temp- 98 °F (36.7 °C) (Tympanic) O2 sat- 100%--      --------------------  Please note that portions of this were completed with a voice recognition program.       Note Disclaimer: At [facility], we believe that sharing information builds trust and better relationships. You are receiving this note because you are receiving care at [facility] or recently visited. It is possible you will see health information before a provider has talked with you about it. This kind of information can be easy to misunderstand. To help you fully understand what it means for your health, we urge you to discuss this note with your provider.             Vitaly Resendiz MD  02/26/24 1071

## 2024-02-26 NOTE — DISCHARGE INSTRUCTIONS
Go home and rest today.  Take hydroxyzine as needed.  Continue taking metformin as directed.  See your doctor in 2 days as scheduled.

## 2024-02-26 NOTE — ED NOTES
"Pt is having heart palpitations.  Per friend he is \"slurring his words\" x 3 hours.  This is not noted in triage.  Pt reports his blood sugar was hi this am but doesn't know what it was.  He is a diabetic but isn't on insulin  "

## 2024-02-28 ENCOUNTER — OFFICE VISIT (OUTPATIENT)
Dept: FAMILY MEDICINE CLINIC | Facility: CLINIC | Age: 44
End: 2024-02-28
Payer: COMMERCIAL

## 2024-02-28 VITALS
SYSTOLIC BLOOD PRESSURE: 110 MMHG | HEART RATE: 92 BPM | DIASTOLIC BLOOD PRESSURE: 72 MMHG | TEMPERATURE: 98.9 F | BODY MASS INDEX: 27.55 KG/M2 | OXYGEN SATURATION: 99 % | HEIGHT: 69 IN | RESPIRATION RATE: 16 BRPM | WEIGHT: 186 LBS

## 2024-02-28 DIAGNOSIS — R79.89 ELEVATED LIVER FUNCTION TESTS: ICD-10-CM

## 2024-02-28 DIAGNOSIS — E55.9 VITAMIN D DEFICIENCY: ICD-10-CM

## 2024-02-28 DIAGNOSIS — E78.2 MIXED HYPERLIPIDEMIA: Primary | ICD-10-CM

## 2024-02-28 DIAGNOSIS — E11.65 UNCONTROLLED TYPE 2 DIABETES MELLITUS WITH HYPERGLYCEMIA: ICD-10-CM

## 2024-02-28 DIAGNOSIS — F41.9 ANXIETY: ICD-10-CM

## 2024-02-28 RX ORDER — BLOOD SUGAR DIAGNOSTIC
STRIP MISCELLANEOUS
Qty: 200 EACH | Refills: 0 | Status: SHIPPED | OUTPATIENT
Start: 2024-02-28

## 2024-02-28 RX ORDER — ERGOCALCIFEROL 1.25 MG/1
50000 CAPSULE ORAL WEEKLY
Qty: 13 CAPSULE | Refills: 0 | Status: SHIPPED | OUTPATIENT
Start: 2024-02-28

## 2024-02-28 RX ORDER — HYDROXYZINE HYDROCHLORIDE 25 MG/1
25 TABLET, FILM COATED ORAL EVERY 6 HOURS PRN
Qty: 20 TABLET | Refills: 0 | Status: SHIPPED | OUTPATIENT
Start: 2024-02-28

## 2024-02-28 NOTE — PROGRESS NOTES
"Subjective   Guilherme Garcia is a 44 y.o. male who presents today in follow up of hospital and initial visit.     Anxiety           He repots he has had anxiety in the past but did not treat in the past.     He had hepatitis B- was very sick and went to rehab and had to learn to walk again in [place]. They put on low dose medication for anxiety - feeling like heart jumping out of his chest and thinking he was going to die caused anxiety. He did not speak up but they saw it and started on medication. Put on Klonopin and helped significantly.     He thinks it is the \"trauma of what is going on with him\". He thinks this is causing him increased anxiety, panic.   He reports someone from work had to drive him to ER.     He reports lower lumbar stress and does not know the structural issues with low back pain has pain in low back and pain in bilateral legs. When sitting prolonged, bothers more. He notices more at that time.     He reports he thinks he is causing   He can't talk right and can't think right. Thinks he could have had a mild stroke or something.     Blood sugars- am- 160 average. Then eats and will take medication. He then has improvement- increases to about 230 and comes back down to 150s. Reports his glucose dropped to 60. Then he decreased his dose again. Taking metformin 500 mg or cutting in 1/2 and taking 250 mg. He is not taking as directed.       Previous PCP- not in decades. Dr Almeida- [facility].   [facility] and hospital in [place] has most of records. Previously [facility] but bought out by [facility].   Previously admit at that hospital- had Hepatitis B in the past. Did not know was diabetic but found out was diabetic. They told with watching diet, could be corrected. Was on Insulin and Metformin    A1C 15.1%, Glucose 314. Sodium 133.     With Metformin 250 has been the lowest and up to 350.   Not sure how long on [employer].   He reports he would like to get One Touch " Verio meter, strips and lancets. He has been using a meter that he borrowed from someone but has to give back.     He is only on Metformin 500 mg twice daily. Increased to 3 x daily when glucose was elevated. 6-7 am, noon, and supper.    In the past, has only     Nasal congestion.   Glucose 511 at ER. Drank water and rechecked at 400s. Increased thirst and urination.   Prediabetes 2018. Reports he then took insulin and metformin for a couple years but stopped.     Has had increased indigestion. Does not cause bleeding. A couple times a week, awakens with blood in mouth that has to cough out.  Started this month. No unintentional weight loss. No night sweats.     Hepatitis B- 2017 or 2018 reports he was paralyzed and unable to walk. He had to go to rehab and learn to walk again. Reports he was sent to [facility] because [facility] could not handle. Rehab was in [place]. Dean started on Insulin and Metformin.     Past Medical History:   Diagnosis Date    Hepatitis B     TOOK MEDS    Bleeding in mouth     WHEN WAKES UP IN THE AM    Diabetes mellitus     Dry throat     AND IRRITATED WHEN WAKE UP MORNING    GERD (gastroesophageal reflux disease)     History of kidney stones      Past Surgical History:   Procedure Laterality Date    ENDOSCOPY N/A 2024    Procedure: ESOPHAGOGASTRODUODENOSCOPY with cold biopsies;  Surgeon: Elijah Amaral MD;  Location: [facility] ENDOSCOPY;  Service: Gastroenterology;  Laterality: N/A;  pre: dyspepsia, globus  post: esophagitis, gastritis, hiatal hernia    KIDNEY STONE SURGERY       Social History     Socioeconomic History    Marital status:    Tobacco Use    Smoking status: Former     Current packs/day: 0.00     Average packs/day: 1 pack/day for 27.0 years (27.0 ttl pk-yrs)     Types: Cigarettes     Start date:      Quit date: 2018     Years since quittin.2    Smokeless tobacco: Never   Vaping Use    Vaping status: Every Day    Substances: Nicotine    Devices:  Refillable tank   Substance and Sexual Activity    Alcohol use: Not Currently    Drug use: Not Currently     Comment: hx of narcotic pain medication SOBER SINCE 11/2022    Sexual activity: Defer      Family History   Problem Relation Age of Onset    Malig Hyperthermia Neg Hx         The following portions of the patient's history were reviewed and updated as appropriate: allergies, current medications, past family history, past medical history, past social history, past surgical history and problem list.    Review of Systems    Objective   Vitals:    02/28/24 1508   BP: 110/72   Pulse:    Resp:    Temp:    SpO2:      Body mass index is 27.45 kg/m².    Physical Exam  Vitals and nursing note reviewed.   Constitutional:       Appearance: He is well-developed.   HENT:      Head: Normocephalic and atraumatic.      Right Ear: External ear normal.      Left Ear: External ear normal.   Eyes:      Conjunctiva/sclera: Conjunctivae normal.   Neck:      Thyroid: No thyroid mass or thyromegaly.      Vascular: No carotid bruit.      Trachea: No tracheal deviation.   Cardiovascular:      Rate and Rhythm: Normal rate and regular rhythm.      Heart sounds: Normal heart sounds.   Pulmonary:      Effort: Pulmonary effort is normal.      Breath sounds: Normal breath sounds.   Skin:     General: Skin is warm and dry.   Neurological:      Mental Status: He is alert and oriented to person, place, and time.      Gait: Gait normal.   Psychiatric:         Behavior: Behavior normal.         Thought Content: Thought content normal.         Assessment & Plan   Diagnoses and all orders for this visit:    1. Mixed hyperlipidemia (Primary)  -     Ambulatory Referral to Endocrinology    2. Uncontrolled type 2 diabetes mellitus with hyperglycemia  -     Ambulatory Referral to Endocrinology  -     glucose blood (OneTouch Verio) test strip; Check glucose 3-4 x daily  Dispense: 200 each; Refill: 0    3. Vitamin D deficiency  -     Ambulatory Referral  "to Endocrinology  -     vitamin D (ERGOCALCIFEROL) 1.25 MG (71930 UT) capsule capsule; Take 1 capsule by mouth 1 (One) Time Per Week.  Dispense: 13 capsule; Refill: 0    4. Elevated liver function tests    5. Anxiety  -     Ambulatory Referral to Psychiatry  -     hydrOXYzine (ATARAX) 25 MG tablet; Take 1 tablet by mouth Every 6 (Six) Hours As Needed for Anxiety.  Dispense: 20 tablet; Refill: 0          Assessment and Plan  Patient is establishing care after hospital visit after feeling \"woozy\" and light-headed and checking glucose at home at 511.  He was started on Metformin and is establishing care due to no PCP in years. Patient will have fasting labs. Call if no results in 1 week. Stability of conditions, plan, follow up, and further recommendations pending labs. Follow up in 2 weeks for review of records, glucose log, and further evaluation.     Uncontrolled, neglected diabetes mellitus with hyperglycemia- He is a poor historian but has not seen PCP in years. He was told prediabetic in ER 2018 but reports he was given Metformin and Insulin. He reports taking for a couple years then stopping without direction of follow up. He then started to feel light-headed and also had polyuria, polydipsia, and some nasal congestion. Glucose was noted to bee 511 and he went to ER. A1C was 15.1%. He was started on Metformin 500 mg twice daily and discharged from ER to see us.  I will check additional labs and make further recommendations. He will likely need additional medication. We will also need to ensure he updates diabetic eye and foot exams and I will give glucometer to monitor glucose fasting in the morning, 2 hours after largest meal, and bedtime. To submit log in the next 3-7 days.   History of gout- He has possible history of gout. I will check uric acid with labs. No symptoms today. To be seen if recurrence of symptoms.   GERD, spitting up blood- He reports having very uncontrolled. GERD symptoms but also reports " "\"spitting up\" blood that he is unsure if he is coughing up, is coming from uncontrolled GERD, or if this is from drainage from sinuses. He is a very poor historian and initially reported symptoms to get advice for a friend then admitted symptoms were from him. I will check labs, refer for CT chest, to GI to consider EGD and colonoscopy, and to ENT for evaluation of sinuses and consideration of laryngoscope. For now, I will start Protonix 40 mg daily. To ER ASAP if bleeding.   Possible history of hepatitis B- He is a poor historian but may have had history of hepatitis B and hospitalization in the past. I asked that he sign a release for hospital records and I will check hepatitis B and C testing. Of note, he has Methadone on medication list- it is unclear the history of drug use or possible risk factors for hepatitis. I will discuss this more at follow up with review of records.     I spent 35 minutes caring for Guilherme Garcia on this date of service. This time includes time spent by me in the following activities as necessary: preparing for the visit, reviewing tests, specialists records and previous visits, obtaining and/or reviewing a separately obtained history, performing a medically appropriate exam and/or evaluation, counseling and educating the patient, family, caregiver, referring and/or communicating with other healthcare professionals, documenting information in the medical record, independently interpreting results and communicating that information with the patient, family, caregiver, and developing a medically appropriate treatment plan with consideration of other conditions, medications, and treatments.          "

## 2024-03-24 ENCOUNTER — NURSE TRIAGE (OUTPATIENT)
Dept: CALL CENTER | Facility: HOSPITAL | Age: 44
End: 2024-03-24
Payer: COMMERCIAL

## 2024-03-24 NOTE — TELEPHONE ENCOUNTER
"Caller states that he was feeling light headed and his BS was 68. He drank orange juice and it is now up to 106. Discussed the need to add protein to keep BS up. Discussed concentrated sugars and need to read labels. His PCP is referring him to an endocrinologist.   Reason for Disposition  • [1] Blood glucose 70  mg/dL (3.9 mmol/L) or below OR symptomatic, now improved with Care Advice AND [2] cause unknown    Additional Information  • Negative: Unconscious or difficult to awaken  • Negative: Seizure occurs  • Negative: Acting confused (e.g., disoriented, slurred speech)  • Negative: Very weak (e.g., can't stand)  • Negative: Sounds like a life-threatening emergency to the triager  • Negative: [1] Vomiting AND [2] signs of dehydration (e.g., very dry mouth, lightheaded, dark urine)  • Negative: [1] Low blood sugar symptoms persist > 30 minutes AND [2] using low blood sugar Care Advice  • Negative: [1] Low blood glucose (70 mg/dl [3.9 mmol/l] or below) persists > 30 minutes AND [2] using low blood sugar Care Advice  • Negative: Patient sounds very sick or weak to the triager  • Negative: [1] Low blood sugar symptoms with no other adult present AND [2] hasn't tried Care Advice  • Negative: [1] Low blood glucose (70 mg/dl [3.9 mmol/l] or below)) with no other adult present AND [2] hasn't tried Care Advice  • Negative: Diabetes drug error or overdose (e.g., insulin error or extra dose)  • Negative: [1] Caller has URGENT medication or insulin pump question AND [2] triager unable to answer question  • Negative: [1] Caller has NON-URGENT medication or insulin pump question AND [2] triager unable to answer question  • Negative: [1] Morning (before breakfast) blood glucose < 80 mg/dL (4.4 mmol/L) AND [2] more than once in past week  • Negative: [1] Evening (after bedtime snack) blood glucose < 100 mg/dL (5.6 mmol/L) AND [2] more than once in past week    Answer Assessment - Initial Assessment Questions  1. SYMPTOMS: \"What " "symptoms are you concerned about?\"      Light headed  2. ONSET:  \"When did the symptoms start?\"      Prior to call  3. BLOOD GLUCOSE: \"What is your blood glucose level?\"       68 now 106 after orange juice  4. USUAL RANGE: \"What is your blood glucose level usually?\" (e.g., usual fasting morning value, usual evening value)      170 fasting am, varies during the day  5. TYPE 1 or 2:  \"Do you know what type of diabetes you have?\"  (e.g., Type 1, Type 2, Gestational; doesn't know)       type  6. INSULIN: \"Do you take insulin?\" \"What type of insulin(s) do you use? What is the mode of delivery? (syringe, pen; injection or pump) \"When did you last give yourself an insulin dose?\" (i.e., time or hours/minutes ago) \"How much did you give?\" (i.e., how many units)      no  7. DIABETES PILLS: \"Do you take any pills for your diabetes?\" If Yes, ask: \"What is the name of the medicine(s) that you take for high blood sugar?\"      metformin  8. OTHER SYMPTOMS: \"Do you have any symptoms?\" (e.g., fever, frequent urination, difficulty breathing, vomiting)      no  9. LOW BLOOD GLUCOSE TREATMENT: \"What have you done so far to treat the low blood glucose level?\"      Drank juice  10. FOOD: \"When did you last eat or drink?\"        This morning  11. ALONE: \"Are you alone right now or is someone with you?\"         alone  12. PREGNANCY: \"Is there any chance you are pregnant?\" \"When was your last menstrual period?\"        na    Protocols used: Diabetes - Low Blood Sugar-ADULT-AH    "

## 2024-03-24 NOTE — TELEPHONE ENCOUNTER
Vahe states that he was feeling light headed and his BS was 68.  He drank orange juice and it is now up to 106.  Discussed the need to add protein to keep BS up.  Discussed concentrated sugars and need to read labels.  His PCP is referring him to an endocrinologist.

## 2024-04-19 ENCOUNTER — OFFICE VISIT (OUTPATIENT)
Dept: FAMILY MEDICINE CLINIC | Facility: CLINIC | Age: 44
End: 2024-04-19
Payer: COMMERCIAL

## 2024-04-19 VITALS
HEIGHT: 69 IN | TEMPERATURE: 98 F | SYSTOLIC BLOOD PRESSURE: 118 MMHG | OXYGEN SATURATION: 95 % | DIASTOLIC BLOOD PRESSURE: 84 MMHG | WEIGHT: 180 LBS | HEART RATE: 96 BPM | BODY MASS INDEX: 26.66 KG/M2

## 2024-04-19 DIAGNOSIS — G47.30 OBSERVED SLEEP APNEA: Primary | ICD-10-CM

## 2024-04-19 DIAGNOSIS — E11.65 UNCONTROLLED TYPE 2 DIABETES MELLITUS WITH HYPERGLYCEMIA: ICD-10-CM

## 2024-04-19 DIAGNOSIS — R10.13 DYSPEPSIA: ICD-10-CM

## 2024-04-19 DIAGNOSIS — R79.89 ELEVATED LIVER FUNCTION TESTS: ICD-10-CM

## 2024-04-19 DIAGNOSIS — E55.9 VITAMIN D DEFICIENCY: ICD-10-CM

## 2024-04-19 DIAGNOSIS — K21.9 GASTROESOPHAGEAL REFLUX DISEASE, UNSPECIFIED WHETHER ESOPHAGITIS PRESENT: ICD-10-CM

## 2024-04-19 DIAGNOSIS — D35.00 ADRENAL ADENOMA, UNSPECIFIED LATERALITY: ICD-10-CM

## 2024-04-19 DIAGNOSIS — E78.2 MIXED HYPERLIPIDEMIA: ICD-10-CM

## 2024-04-19 PROCEDURE — 99214 OFFICE O/P EST MOD 30 MIN: CPT | Performed by: PHYSICIAN ASSISTANT

## 2024-04-19 RX ORDER — PREDNISONE 20 MG/1
TABLET ORAL
COMMUNITY
Start: 2024-04-02 | End: 2024-05-02

## 2024-04-19 RX ORDER — FLUTICASONE PROPIONATE 50 MCG
2 SPRAY, SUSPENSION (ML) NASAL DAILY
COMMUNITY
Start: 2024-04-02

## 2024-04-19 RX ORDER — AZITHROMYCIN 250 MG/1
TABLET, FILM COATED ORAL
COMMUNITY
Start: 2024-04-02 | End: 2024-05-03

## 2024-04-19 RX ORDER — GUAIFENESIN, DEXTROMETHORPHAN HBR 60; 1200 MG/1; MG/1
TABLET ORAL
COMMUNITY
Start: 2024-04-02 | End: 2024-05-03

## 2024-04-19 RX ORDER — FEXOFENADINE HYDROCHLORIDE 180 MG/1
TABLET ORAL
COMMUNITY
Start: 2024-04-02

## 2024-04-24 DIAGNOSIS — E11.65 UNCONTROLLED TYPE 2 DIABETES MELLITUS WITH HYPERGLYCEMIA: ICD-10-CM

## 2024-04-25 RX ORDER — BLOOD SUGAR DIAGNOSTIC
STRIP MISCELLANEOUS
Qty: 100 EACH | Refills: 0 | Status: SHIPPED | OUTPATIENT
Start: 2024-04-25

## 2024-04-25 RX ORDER — LANCETS 33 GAUGE
EACH MISCELLANEOUS
Qty: 100 EACH | Refills: 0 | Status: SHIPPED | OUTPATIENT
Start: 2024-04-25

## 2024-04-25 NOTE — TELEPHONE ENCOUNTER
Patient should be seeing endocrinology as referred 2/2024. I will refill once, but he needs to see endocrinology as discussed.

## 2024-04-26 ENCOUNTER — OFFICE VISIT (OUTPATIENT)
Dept: FAMILY MEDICINE CLINIC | Facility: CLINIC | Age: 44
End: 2024-04-26
Payer: COMMERCIAL

## 2024-04-26 ENCOUNTER — HOSPITAL ENCOUNTER (OUTPATIENT)
Dept: CARDIOLOGY | Facility: HOSPITAL | Age: 44
Discharge: HOME | End: 2024-04-26
Payer: COMMERCIAL

## 2024-04-26 VITALS
SYSTOLIC BLOOD PRESSURE: 132 MMHG | WEIGHT: 180 LBS | BODY MASS INDEX: 26.66 KG/M2 | DIASTOLIC BLOOD PRESSURE: 78 MMHG | HEIGHT: 69 IN | OXYGEN SATURATION: 96 % | TEMPERATURE: 97.5 F | HEART RATE: 79 BPM | RESPIRATION RATE: 19 BRPM

## 2024-04-26 DIAGNOSIS — M54.50 PAIN OF LUMBAR SPINE: ICD-10-CM

## 2024-04-26 DIAGNOSIS — S82.251K CLOSED DISPLACED COMMINUTED FRACTURE OF SHAFT OF RIGHT TIBIA WITH NONUNION, SUBSEQUENT ENCOUNTER: ICD-10-CM

## 2024-04-26 DIAGNOSIS — S22.31XK CLOSED FRACTURE OF ONE RIB OF RIGHT SIDE WITH NONUNION, SUBSEQUENT ENCOUNTER: ICD-10-CM

## 2024-04-26 DIAGNOSIS — E27.8 ADRENAL MASS: ICD-10-CM

## 2024-04-26 DIAGNOSIS — M79.89 RIGHT LEG SWELLING: Primary | ICD-10-CM

## 2024-04-26 DIAGNOSIS — M54.6 PAIN IN THORACIC SPINE: ICD-10-CM

## 2024-04-26 DIAGNOSIS — M79.89 RIGHT LEG SWELLING: ICD-10-CM

## 2024-04-26 DIAGNOSIS — S32.009D CLOSED FRACTURE OF TRANSVERSE PROCESS OF LUMBAR VERTEBRA WITH ROUTINE HEALING: ICD-10-CM

## 2024-04-26 PROBLEM — M54.9 BACKACHE: Status: ACTIVE | Noted: 2024-04-13

## 2024-04-26 PROBLEM — S82.209A FRACTURE OF TIBIA: Status: ACTIVE | Noted: 2024-04-13

## 2024-04-26 PROBLEM — R10.9 FLANK PAIN: Status: ACTIVE | Noted: 2024-04-13

## 2024-04-26 PROBLEM — E11.9 TYPE 2 DIABETES MELLITUS: Status: ACTIVE | Noted: 2024-04-12

## 2024-04-26 PROBLEM — S22.31XA FRACTURE OF RIB OF RIGHT SIDE: Status: ACTIVE | Noted: 2024-04-13

## 2024-04-26 LAB
BH CV LOWER VASCULAR LEFT COMMON FEMORAL AUGMENT: NORMAL
BH CV LOWER VASCULAR LEFT COMMON FEMORAL COMPETENT: NORMAL
BH CV LOWER VASCULAR LEFT COMMON FEMORAL COMPRESS: NORMAL
BH CV LOWER VASCULAR LEFT COMMON FEMORAL PHASIC: NORMAL
BH CV LOWER VASCULAR LEFT COMMON FEMORAL SPONT: NORMAL
BH CV LOWER VASCULAR RIGHT COMMON FEMORAL AUGMENT: NORMAL
BH CV LOWER VASCULAR RIGHT COMMON FEMORAL COMPETENT: NORMAL
BH CV LOWER VASCULAR RIGHT COMMON FEMORAL COMPRESS: NORMAL
BH CV LOWER VASCULAR RIGHT COMMON FEMORAL PHASIC: NORMAL
BH CV LOWER VASCULAR RIGHT COMMON FEMORAL SPONT: NORMAL
BH CV LOWER VASCULAR RIGHT DISTAL FEMORAL COMPRESS: NORMAL
BH CV LOWER VASCULAR RIGHT GASTRONEMIUS COMPRESS: NORMAL
BH CV LOWER VASCULAR RIGHT GREATER SAPH AK COMPRESS: NORMAL
BH CV LOWER VASCULAR RIGHT GREATER SAPH BK COMPRESS: NORMAL
BH CV LOWER VASCULAR RIGHT LESSER SAPH COMPRESS: NORMAL
BH CV LOWER VASCULAR RIGHT MID FEMORAL AUGMENT: NORMAL
BH CV LOWER VASCULAR RIGHT MID FEMORAL COMPETENT: NORMAL
BH CV LOWER VASCULAR RIGHT MID FEMORAL COMPRESS: NORMAL
BH CV LOWER VASCULAR RIGHT MID FEMORAL PHASIC: NORMAL
BH CV LOWER VASCULAR RIGHT MID FEMORAL SPONT: NORMAL
BH CV LOWER VASCULAR RIGHT PERONEAL COMPRESS: NORMAL
BH CV LOWER VASCULAR RIGHT POPLITEAL AUGMENT: NORMAL
BH CV LOWER VASCULAR RIGHT POPLITEAL COMPETENT: NORMAL
BH CV LOWER VASCULAR RIGHT POPLITEAL COMPRESS: NORMAL
BH CV LOWER VASCULAR RIGHT POPLITEAL PHASIC: NORMAL
BH CV LOWER VASCULAR RIGHT POPLITEAL SPONT: NORMAL
BH CV LOWER VASCULAR RIGHT POSTERIOR TIBIAL COMPRESS: NORMAL
BH CV LOWER VASCULAR RIGHT PROFUNDA FEMORAL COMPRESS: NORMAL
BH CV LOWER VASCULAR RIGHT PROXIMAL FEMORAL COMPRESS: NORMAL
BH CV LOWER VASCULAR RIGHT SAPHENOFEMORAL JUNCTION COMPRESS: NORMAL
BH CV VAS PRELIMINARY FINDINGS SCRIPTING: 1

## 2024-04-26 PROCEDURE — 93971 EXTREMITY STUDY: CPT

## 2024-04-26 RX ORDER — ENOXAPARIN SODIUM 100 MG/ML
INJECTION SUBCUTANEOUS
COMMUNITY
Start: 2024-04-18
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
## 2024-05-01 ENCOUNTER — TELEPHONE (OUTPATIENT)
Dept: FAMILY MEDICINE CLINIC | Facility: CLINIC | Age: 44
End: 2024-05-01
Payer: COMMERCIAL

## 2024-05-01 DIAGNOSIS — F41.9 ANXIETY: ICD-10-CM

## 2024-05-01 DIAGNOSIS — F39 MOOD DISORDER: ICD-10-CM

## 2024-05-01 DIAGNOSIS — M54.6 PAIN IN THORACIC SPINE: Primary | ICD-10-CM

## 2024-05-01 DIAGNOSIS — S32.009D CLOSED FRACTURE OF TRANSVERSE PROCESS OF LUMBAR VERTEBRA WITH ROUTINE HEALING: ICD-10-CM
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
## 2024-05-01 DIAGNOSIS — M54.50 PAIN OF LUMBAR SPINE: ICD-10-CM
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
## 2024-05-02 ENCOUNTER — SPECIALTY PHARMACY (OUTPATIENT)
Dept: ENDOCRINOLOGY | Age: 44
End: 2024-05-02
Payer: COMMERCIAL

## 2024-05-02 ENCOUNTER — TELEPHONE (OUTPATIENT)
Dept: FAMILY MEDICINE CLINIC | Facility: CLINIC | Age: 44
End: 2024-05-02

## 2024-05-02 ENCOUNTER — LAB (OUTPATIENT)
Facility: HOSPITAL | Age: 44
End: 2024-05-02
Payer: COMMERCIAL

## 2024-05-02 ENCOUNTER — OFFICE VISIT (OUTPATIENT)
Dept: ENDOCRINOLOGY | Age: 44
End: 2024-05-02
Payer: COMMERCIAL

## 2024-05-02 ENCOUNTER — TELEPHONE (OUTPATIENT)
Dept: ENDOCRINOLOGY | Age: 44
End: 2024-05-02

## 2024-05-02 VITALS
WEIGHT: 176 LBS | SYSTOLIC BLOOD PRESSURE: 110 MMHG | HEART RATE: 88 BPM | OXYGEN SATURATION: 98 % | HEIGHT: 69 IN | BODY MASS INDEX: 26.07 KG/M2 | DIASTOLIC BLOOD PRESSURE: 74 MMHG

## 2024-05-02 DIAGNOSIS — E27.8 LEFT ADRENAL MASS: ICD-10-CM

## 2024-05-02 DIAGNOSIS — E11.65 TYPE 2 DIABETES MELLITUS WITH HYPERGLYCEMIA, WITHOUT LONG-TERM CURRENT USE OF INSULIN: Primary | ICD-10-CM

## 2024-05-02 LAB
ALBUMIN SERPL-MCNC: 4.1 G/DL (ref 3.5–5.2)
ALBUMIN/GLOB SERPL: 1.1 G/DL
ALP SERPL-CCNC: 61 U/L (ref 39–117)
ALT SERPL W P-5'-P-CCNC: 31 U/L (ref 1–41)
ANION GAP SERPL CALCULATED.3IONS-SCNC: 12.2 MMOL/L (ref 5–15)
AST SERPL-CCNC: 21 U/L (ref 1–40)
BILIRUB SERPL-MCNC: <0.2 MG/DL (ref 0–1.2)
BUN SERPL-MCNC: 18 MG/DL (ref 6–20)
BUN/CREAT SERPL: 21.4 (ref 7–25)
CALCIUM SPEC-SCNC: 9.9 MG/DL (ref 8.6–10.5)
CHLORIDE SERPL-SCNC: 100 MMOL/L (ref 98–107)
CO2 SERPL-SCNC: 26.8 MMOL/L (ref 22–29)
CREAT SERPL-MCNC: 0.84 MG/DL (ref 0.76–1.27)
EGFRCR SERPLBLD CKD-EPI 2021: 108.9 ML/MIN/1.73
GLOBULIN UR ELPH-MCNC: 3.7 GM/DL
GLUCOSE SERPL-MCNC: 137 MG/DL (ref 65–99)
POTASSIUM SERPL-SCNC: 5 MMOL/L (ref 3.5–5.2)
PROT SERPL-MCNC: 7.8 G/DL (ref 6–8.5)
SODIUM SERPL-SCNC: 139 MMOL/L (ref 136–145)

## 2024-05-02 PROCEDURE — 84681 ASSAY OF C-PEPTIDE: CPT | Performed by: STUDENT IN AN ORGANIZED HEALTH CARE EDUCATION/TRAINING PROGRAM

## 2024-05-02 PROCEDURE — 86341 ISLET CELL ANTIBODY: CPT | Performed by: STUDENT IN AN ORGANIZED HEALTH CARE EDUCATION/TRAINING PROGRAM

## 2024-05-02 PROCEDURE — 80053 COMPREHEN METABOLIC PANEL: CPT | Performed by: STUDENT IN AN ORGANIZED HEALTH CARE EDUCATION/TRAINING PROGRAM

## 2024-05-02 PROCEDURE — 83835 ASSAY OF METANEPHRINES: CPT | Performed by: STUDENT IN AN ORGANIZED HEALTH CARE EDUCATION/TRAINING PROGRAM

## 2024-05-02 PROCEDURE — 36415 COLL VENOUS BLD VENIPUNCTURE: CPT | Performed by: STUDENT IN AN ORGANIZED HEALTH CARE EDUCATION/TRAINING PROGRAM

## 2024-05-02 PROCEDURE — 84244 ASSAY OF RENIN: CPT | Performed by: STUDENT IN AN ORGANIZED HEALTH CARE EDUCATION/TRAINING PROGRAM

## 2024-05-02 PROCEDURE — 82088 ASSAY OF ALDOSTERONE: CPT | Performed by: STUDENT IN AN ORGANIZED HEALTH CARE EDUCATION/TRAINING PROGRAM

## 2024-05-02 RX ORDER — DEXAMETHASONE 1 MG
1 TABLET ORAL ONCE
Qty: 1 TABLET | Refills: 0 | Status: SHIPPED | OUTPATIENT
Start: 2024-05-02 | End: 2024-05-02

## 2024-05-02 RX ORDER — PEN NEEDLE, DIABETIC 30 GX3/16"
1 NEEDLE, DISPOSABLE MISCELLANEOUS DAILY
Qty: 100 EACH | Refills: 1 | Status: SHIPPED | OUTPATIENT
Start: 2024-05-02

## 2024-05-02 RX ORDER — BLOOD-GLUCOSE METER
1 KIT MISCELLANEOUS ONCE
Qty: 1 EACH | Refills: 0 | Status: SHIPPED | OUTPATIENT
Start: 2024-05-02 | End: 2024-05-02

## 2024-05-02 RX ORDER — DEXAMETHASONE 1 MG
1 TABLET ORAL ONCE
Qty: 1 TABLET | Refills: 0 | Status: SHIPPED | OUTPATIENT
Start: 2024-05-02 | End: 2024-05-03

## 2024-05-02 RX ORDER — DIPHENHYDRAMINE HYDROCHLORIDE 25 MG/1
1 CAPSULE, LIQUID FILLED ORAL ONCE
Qty: 1 EACH | Refills: 0 | Status: SHIPPED | OUTPATIENT
Start: 2024-05-02 | End: 2024-05-02

## 2024-05-02 RX ORDER — LANCETS 30 GAUGE
1 EACH MISCELLANEOUS 4 TIMES DAILY
Qty: 100 EACH | Refills: 3 | Status: SHIPPED | OUTPATIENT
Start: 2024-05-02

## 2024-05-02 RX ORDER — LANCETS 30 GAUGE
1 EACH MISCELLANEOUS 4 TIMES DAILY
Qty: 100 EACH | Refills: 3 | Status: SHIPPED | OUTPATIENT
Start: 2024-05-02 | End: 2024-05-02

## 2024-05-02 RX ORDER — SYRING-NEEDL,DISP,INSUL,0.3 ML 30 GX5/16"
1 SYRINGE, EMPTY DISPOSABLE MISCELLANEOUS ONCE
Qty: 1 EACH | Refills: 0 | Status: SHIPPED | OUTPATIENT
Start: 2024-05-02 | End: 2024-05-02

## 2024-05-02 RX ORDER — PEN NEEDLE, DIABETIC 30 GX3/16"
1 NEEDLE, DISPOSABLE MISCELLANEOUS DAILY
Qty: 100 EACH | Refills: 1 | Status: SHIPPED | OUTPATIENT
Start: 2024-05-02 | End: 2024-05-02

## 2024-05-02 RX ORDER — SYRING-NEEDL,DISP,INSUL,0.3 ML 30 GX5/16"
1 SYRINGE, EMPTY DISPOSABLE MISCELLANEOUS ONCE
Qty: 1 EACH | Refills: 0 | Status: SHIPPED | OUTPATIENT
Start: 2024-05-02 | End: 2024-05-07

## 2024-05-02 NOTE — TELEPHONE ENCOUNTER
I do not have another office to refer to. Michelle R- can you see if Sandip can see him? If not, it will need to be [facility] and they will have to drive there.

## 2024-05-02 NOTE — ASSESSMENT & PLAN NOTE
Adrenal Incidentaloma discussed in detail    - The incidence of adrenal incidentaloma, an otherwise unsuspected adrenal mass on radiologic imaging, is around 8%.   - Patients with an adrenal incidentaloma should undergo evaluation clinically, biochemically, and radiographically.   - Any adrenal mass with suspicious radiographic characteristics, and nodules 4 cm or larger should be resected because of her increased risk of adrenal cancer. Benign characteristics on CT include homogenous nodule with regular borders, HU <10 and size <4 cm.   - Evaluation includes hormonal evaluation for hypercortisolism, hyperaldosteronism (if hypertensive), or the presence of a pheochromocytoma.   - Patients with adrenal incidentalomas who do not fulfill the criteria for surgical resection need to have radiographic reevaluation at 3 to 6 months and then annually for 1 to 2 years. For all adrenal tumors, hormonal evaluation should be performed at the time of diagnosis and then annually for 5 years.   - Approximately 10 - 20% of patients with adrenal nodules, particularly those with low attenuation values, have evidence of mild ACTH-independent hypercortisolism. Complications can include low bone density, with fractures, hypertension, and other features of the metabolic syndrome.   - Screening for hypercortisolism can be performed with a 1 mg dexamethasone suppression test. There is no consensus on what constitutes a normal post dexamethasone cortisol value. Many guidelines have recommended that cortisol should suppress to less than 1.8 µg/dL.     Will repeat CT adrenals to assess the appearance of the mass  Given low potassium in the past we will check aldosterone renin ratio to rule out hyperaldosteronism  Will do dexamethasone suppression test to rule out Cushing  Will check plasma fractionated metanephrine to rule out pheochromocytoma
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
## 2024-05-02 NOTE — TELEPHONE ENCOUNTER
PATIENT CALLED AND STATES TYRELL CARROLL WILL HAVE TO ORDER THE SPINE/ NEURO  REFERRAL. HE ASKED HIS ORTHO DR AND HE STATED HIS PCP WILL HAVE TO HAVE THE ORDER PLACED.    CALL BACK NUMBER [phone]

## 2024-05-02 NOTE — TELEPHONE ENCOUNTER
Patient called asking if he needs to start taking the 6 unites of insulin today or start tomorrow
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
## 2024-05-02 NOTE — PATIENT INSTRUCTIONS
[facility]  [address]  [place] [address]    Today's Date: 5/2/2024    Insulin Dosing Instructions    YOUR INSULIN DOSING HAS BEEN CHANGED TO: Before Breakfast Before Lunch Before Evening Meal Bedtime   Long-Acting Insulins Basaglar (Glargine)  Lantus (Glargine)  Levemir (Detemir)  Semglee (Glargine)  Toujeo (Glargine)  Tresiba (Degludec) 6      Rapid-Acting Insulins Admelog (Lispro)  Apidra (Glulisine)  Flasp (Aspart)  Humalog (Lispro)  Lyumjev (Lispro)  Novolog (Aspart)       NPH Insulin Humulin/Novolin N       Regular Insulin Humulin/Novolin R       Premixed Insulin Humalog/Humulin  Novolog/Novolin  Mix 70/30, 75/25, or 50/50       Sliding Scale for Rapid-acting   or regular insulin based on blood sugar 151-200 + + + +    201-250 + + + +    251-300 + + + +    301-350 + + + +    >350 + + + +     Adjusting your long-acting insulin:    If pre-breakfast sugar is above 140 on 2 consecutive days, then increase your long-acting insulin by 2 units. Max 20 units daily.     If pre-breakfast sugar is below 90 on 2 consecutive days, then decrease your long-acting insulin by 2 units.
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
## 2024-05-02 NOTE — ASSESSMENT & PLAN NOTE
Diabetes is improving with lifestyle modifications.   Medication changes per orders.  Reminded to bring in blood sugar diary at next visit.  Recommended an ADA diet.  Recommended a Mediterranean style of eating  Regular aerobic exercise.  Discussed ways to avoid symptomatic hypoglycemia.  Discussed sick day management.  Discussed foot care.  Reminded to get yearly retinal exam.  Will start Lantus 6 units daily,  Will check C-peptide and diabetes related antibodies if result is consistent with type 2 diabetes will start p.o. medication  Diabetic diet discussed  Hypoglycemia signs/symptoms and treatment discussed with patient  Diabetes will be reassessed in 3 months
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
## 2024-05-03 ENCOUNTER — PATIENT ROUNDING (BHMG ONLY) (OUTPATIENT)
Dept: ENDOCRINOLOGY | Age: 44
End: 2024-05-03
Payer: COMMERCIAL

## 2024-05-03 ENCOUNTER — TELEPHONE (OUTPATIENT)
Dept: ENDOCRINOLOGY | Age: 44
End: 2024-05-03
Payer: COMMERCIAL

## 2024-05-03 LAB — C PEPTIDE SERPL-MCNC: 5.8 NG/ML (ref 1.1–4.4)

## 2024-05-03 NOTE — PROGRESS NOTES
Specialty Pharmacy Patient Management Program  Endocrinology Initial Assessment     Guilherme Garcia was referred by an Endocrinology provider to the Endocrinology Patient Management program offered by [facility] for Type 2 Diabetes on 24.  An initial outreach was conducted, including assessment of therapy appropriateness and specialty medication education for Lantus. The patient was introduced to services offered by [facility], including: regular assessments, refill coordination, curbside pick-up or mail order delivery options, prior authorization maintenance, and financial assistance programs as applicable. The patient was also provided with contact information for the pharmacy team.     Insurance Coverage & Financial Support  [place] Medicaid    Benefits Investigation Summary    Prescription: New Therapy    Dispensing pharmacy: [facility]    Copay amount: Lantus $0/15 mL    Prior Auth and Med Assistance notes: N/A    BIN: [id number]  PCN: [id number]  RX GROUP: [id number]    Relevant Past Medical History and Comorbidities  Relevant medical history and concomitant health conditions were discussed with the patient. The patient's chart has been reviewed for relevant past medical history and comorbid conditions and updated as necessary.  Past Medical History:   Diagnosis Date    Bleeding in mouth     WHEN WAKES UP IN THE AM    Diabetes mellitus     Dry throat     AND IRRITATED WHEN WAKE UP MORNING    GERD (gastroesophageal reflux disease)     Hepatitis B     TOOK MEDS    History of kidney stones      Social History     Socioeconomic History    Marital status:    Tobacco Use    Smoking status: Former     Current packs/day: 0.00     Average packs/day: 1 pack/day for 27.0 years (27.0 ttl pk-yrs)     Types: Cigarettes     Start date:      Quit date: 2018     Years since quittin.3    Smokeless tobacco: Never   Vaping Use    Vaping status: Every Day    Substances: Nicotine     Devices: [employer]   Substance and Sexual Activity    Alcohol use: Not Currently    Drug use: Not Currently     Comment: hx of narcotic pain medication SOBER SINCE 11/2022    Sexual activity: Yes     Partners: Female     Comment: Wife       Problem list reviewed by Mckayla Nayak RPH on 5/2/2024 at  4:06 PM  Problem list reviewed by Mckayla Nayak RPH on 5/3/2024 at  1:26 PM    Allergies  Known allergies and reactions were discussed with the patient. The patient's chart has been reviewed for  allergy information and updated as necessary.   Allergies   Allergen Reactions    Metformin Diarrhea and Nausea And Vomiting       Allergies reviewed by Mckayla Nayak RPH on 5/2/2024 at  4:06 PM  Allergies reviewed by Mckayla Nayak RPH on 5/3/2024 at  1:26 PM    Relevant Laboratory Values  Relevant laboratory values were discussed with the patient. The following specialty medication dose adjustment(s) are recommended: No adjustments needed at this time.   A1C Last 3 Results          2/12/2024    08:25   HGBA1C Last 3 Results   Hemoglobin A1C 12.7      Lab Results   Component Value Date    HGBA1C 12.7 (H) 02/12/2024     Lab Results   Component Value Date    GLUCOSE 137 (H) 05/02/2024    CALCIUM 9.9 05/02/2024     05/02/2024    K 5.0 05/02/2024    CO2 26.8 05/02/2024     05/02/2024    BUN 18 05/02/2024    CREATININE 0.84 05/02/2024    BCR 21.4 05/02/2024    ANIONGAP 12.2 05/02/2024     Lab Results   Component Value Date    CHLPL 209 (H) 02/12/2024    TRIG 281 (H) 02/12/2024    HDL 34 (L) 02/12/2024     (H) 02/12/2024     Microalbumin          2/12/2024    08:25   Microalbumin   Microalbumin, Urine <3.0        Current Medication List  This medication list has been reviewed with the patient and evaluated for any interactions or necessary modifications/recommendations, and updated to include all prescription medications, OTC medications, and supplements the patient is currently taking.  This list  reflects what is contained in the patient's profile, which has also been marked as reviewed to communicate to other providers it is the most up to date version of the patient's current medication therapy.     Current Outpatient Medications:     Blood Glucose Monitoring Suppl (OneTouch Verio Reflect) w/Device kit, Use 1 each Daily., Disp: 1 kit, Rfl: 0    dexAMETHasone (DECADRON) 1 MG tablet, Take 1 tablet by mouth 1 (One) Time for 1 dose. Take at bedtime the day before getting lab work, Disp: 1 tablet, Rfl: 0    Enoxaparin Sodium (LOVENOX) 40 MG/0.4ML solution prefilled syringe syringe, , Disp: , Rfl:     fluticasone (FLONASE) 50 MCG/ACT nasal spray, 2 sprays by Each Nare route Daily., Disp: , Rfl:     FT Allergy Relief 24 Hour 180 MG tablet, , Disp: , Rfl:     glucose blood test strip, Use 1 test strip 4 (Four) Times a Day., Disp: 100 each, Rfl: 3    hydrOXYzine (ATARAX) 25 MG tablet, Take 1 tablet by mouth Every 6 (Six) Hours As Needed for Anxiety., Disp: 20 tablet, Rfl: 0    Insulin Glargine (LANTUS SOLOSTAR) 100 UNIT/ML injection pen, Inject 6 Units under the skin into the appropriate area as directed Daily, Disp: 15 mL, Rfl: 0    Insulin Pen Needle (Pen Needles) 31G X 5 MM misc, Use 1 each Daily., Disp: 100 each, Rfl: 1    Lancet Device misc, Use 1 each 1 (One) Time for 1 dose., Disp: 1 each, Rfl: 0    Lancets misc, Use 1 lancet 4 (Four) Times a Day., Disp: 100 each, Rfl: 3    METHADONE HCL PO, Take 1 tablet by mouth Daily. FOLLOW INSTRUCTIONS PER PRESCRIBER, Disp: , Rfl:     pantoprazole (PROTONIX) 40 MG EC tablet, Take 1 tablet by mouth Daily., Disp: 90 tablet, Rfl: 3    Medicines reviewed by Mckayla Nayak RPH on 5/2/2024 at  4:06 PM  Medicines reviewed by Mckayla Nayak RPH on 5/3/2024 at  1:26 PM    Drug Interactions  Methadone and hydroxyzine  Consider a decrease in the CNS depressant dose, as appropriate, when used together with hydroxyzine. Increase monitoring of signs/symptoms of CNS depression in  any patient receiving hydroxyzine together with another CNS depressant.    Recommended Medications Assessment  Aspirin: Not Taking Currently  Statin: Not Taking Currently  ACEi/ARB: Not Taking Currently    Initial Education Provided for Specialty Medication  The patient has been provided with the following education and any applicable administration techniques (i.e. self-injection) have been demonstrated for the therapies indicated. All questions and concerns have been addressed prior to the patient receiving the medication, and the patient has verbalized comprehension of the education and any materials provided. Additional patient education shall be provided and documented upon request by the patient, provider, or payer.    Adherence and Self-Administration  Adherence related to the patient's specialty therapy was discussed with the patient. The Adherence segment of this outreach has been reviewed and updated.     Is there a concern with patient's ability to self administer the medication correctly and without issue?: No  Were any potential barriers to adherence identified during the initial assessment or patient education?: No  Are there any concerns regarding the patient's understanding of the importance of medication adherence?: No  Methods for Supporting Patient Adherence and/or Self-Administration: Follow with fills and clinical assessments    Open Medication Therapy Problems  No medication therapy recommendations to display    Goals of Therapy  Goals related to the patient's specialty therapy were discussed with the patient. The Patient Goals segment of this outreach has been reviewed and updated.   Goals Addressed Today        Specialty Pharmacy General Goal      A1c < 7%    Lab Results   Component Value Date    HGBA1C 12.7 (H) 02/12/2024    HGBA1C 8.3 (H) 09/15/2018    HGBA1C 9.1 (H) 09/10/2018    HGBA1C 9.6 (H) 09/09/2018                Reassessment Plan & Follow-Up  1. Medication Therapy Changes:   Will  start Lantus 6 units daily   DexAMETHasone (DECADRON) 1 MG tablet; Take 1 tablet by mouth 1 (One) Time for 1 dose. Take at bedtime the day before getting lab work   2. Related Plans, Therapy Recommendations, or Therapy Problems to Be Addressed: No issues  3. Pharmacist to perform regular assessments no more than (6) months from the previous assessment.  4. Care Coordinator to set up future refill outreaches, coordinate prescription delivery, and escalate clinical questions to pharmacist.  5. Welcome information and patient satisfaction survey to be sent by specialty pharmacy team with patient's initial fill.    Attestation  Therapeutic appropriateness: Appropriate   I attest the patient was actively involved in and has agreed to the above plan of care. If the prescribed therapy is at any point deemed not appropriate based on the current or future assessments, a consultation will be initiated with the patient's specialty care provider to determine the best course of action. The revised plan of therapy will be documented along with any required assessments and/or additional patient education provided.     Dana Nayak PharmD, MM  Clinical Specialty Pharmacist, Endocrinology  5/3/2024  13:30 EDT       Specialty Pharmacy Patient Management Program  Endocrinology Refill Outreach      Guilherme is a 44 y.o. male contacted today regarding refills of his medication(s).    Specialty medication(s) and dose(s) confirmed: Lantus, dexamethasone, pen needles and lancing device      Delivery Questions      Flowsheet Row Most Recent Value   Delivery method  at Pharmacy  [Alex]   Number of medications in delivery 1   Medication(s) being filled and delivered Lancet Devices   Doses left of specialty medications N/A   Copay verified? Yes   Copay amount $0   Copay form of payment No copayment ($0)            Follow-Up: Set based on day supply    Dana Nayak PharmD, MM   Clinical Speciality Pharmacist, Endocrinology    5/3/2024  13:34 EDT

## 2024-05-03 NOTE — TELEPHONE ENCOUNTER
Called and spoke with pt. Informed pt of Dr. ROSALIA quinonez. Pt stated he picked up the Rx yesterday and took 3 units last night and 6 units this morning. Pt also requested we fax lab orders to his PCP office. Pt requested a call once those orders are faxed.

## 2024-05-03 NOTE — TELEPHONE ENCOUNTER
Called and spoke with pt. Informed pt the labs order was faxed over but he will need to contact his PCP office to ensure he can come in today for a lab appt. Pt had no further questions or concerns.

## 2024-05-03 NOTE — TELEPHONE ENCOUNTER
----- Message from Gabe GILES sent at     Faxed lab orders to the PCP   [phone]  5/2/2024  3:46 PM EDT -----  Fax the blood test order for cortisol and dexamethasone level to his PCPs office  Thanks  Dr. LINDSEY   [place]

## 2024-05-07 ENCOUNTER — HOSPITAL ENCOUNTER (OUTPATIENT)
Facility: HOSPITAL | Age: 44
Discharge: HOME | End: 2024-05-07
Admitting: NURSE PRACTITIONER
Payer: COMMERCIAL

## 2024-05-07 DIAGNOSIS — R79.89 ELEVATED LIVER FUNCTION TESTS: ICD-10-CM

## 2024-05-07 LAB
GAD65 AB SER IA-ACNC: <5 U/ML (ref 0–5)
PANC ISLET CELL AB TITR SER: NEGATIVE {TITER}

## 2024-05-07 PROCEDURE — 76705 ECHO EXAM OF ABDOMEN: CPT

## 2024-05-08 ENCOUNTER — TELEPHONE (OUTPATIENT)
Dept: ENDOCRINOLOGY | Age: 44
End: 2024-05-08
Payer: COMMERCIAL

## 2024-05-08 ENCOUNTER — TELEPHONE (OUTPATIENT)
Dept: GASTROENTEROLOGY | Facility: CLINIC | Age: 44
End: 2024-05-08
Payer: COMMERCIAL

## 2024-05-08 LAB
ALDOST SERPL-MCNC: 16.4 NG/DL (ref 0–30)
ALDOST/RENIN PLAS-RTO: 3.1 {RATIO} (ref 0–30)
RENIN PLAS-CCNC: 5.38 NG/ML/HR (ref 0.17–5.38)

## 2024-05-08 NOTE — TELEPHONE ENCOUNTER
Caller: Guilherme Garcia    Relationship: Self    Best call back number: [phone]    What is the best time to reach you: ANYTIME    Who are you requesting to speak with (clinical staff, provider,  specific staff member): FRONT      What was the call regarding: PATIENT IS NEEDING TO SCHEDULE HIS COLONOSCOPY. PCP ENCOURAGING HIM TO GET THIS DONE.

## 2024-05-09 ENCOUNTER — TELEPHONE (OUTPATIENT)
Dept: ENDOCRINOLOGY | Age: 44
End: 2024-05-09
Payer: COMMERCIAL

## 2024-05-09 DIAGNOSIS — E27.8 LEFT ADRENAL MASS: Primary | ICD-10-CM

## 2024-05-09 RX ORDER — INSULIN LISPRO 100 [IU]/ML
3 INJECTION, SOLUTION INTRAVENOUS; SUBCUTANEOUS 3 TIMES DAILY
Qty: 15 ML | Refills: 1 | Status: SHIPPED | OUTPATIENT
Start: 2024-05-09

## 2024-05-11 LAB
METANEPH FREE SERPL-MCNC: <25 PG/ML (ref 0–88)
NORMETANEPHRINE SERPL-MCNC: 37.1 PG/ML (ref 0–218.9)
ZNT8 AB SERPL IA-ACNC: <15 U/ML

## 2024-05-13 ENCOUNTER — SPECIALTY PHARMACY (OUTPATIENT)
Dept: ENDOCRINOLOGY | Age: 44
End: 2024-05-13
Payer: COMMERCIAL

## 2024-05-13 ENCOUNTER — TELEPHONE (OUTPATIENT)
Dept: GASTROENTEROLOGY | Facility: CLINIC | Age: 44
End: 2024-05-13
Payer: COMMERCIAL

## 2024-05-13 DIAGNOSIS — E11.65 TYPE 2 DIABETES MELLITUS WITH HYPERGLYCEMIA, WITHOUT LONG-TERM CURRENT USE OF INSULIN: ICD-10-CM

## 2024-05-13 RX ORDER — KETOROLAC TROMETHAMINE 30 MG/ML
1 INJECTION, SOLUTION INTRAMUSCULAR; INTRAVENOUS TAKE AS DIRECTED
Qty: 1 EACH | Refills: 0 | Status: SHIPPED | OUTPATIENT
Start: 2024-05-13

## 2024-05-13 RX ORDER — BLOOD-GLUCOSE SENSOR
1 EACH MISCELLANEOUS
Qty: 2 EACH | Refills: 3 | Status: SHIPPED | OUTPATIENT
Start: 2024-05-13

## 2024-05-13 RX ORDER — PEN NEEDLE, DIABETIC 30 GX3/16"
1 NEEDLE, DISPOSABLE MISCELLANEOUS 4 TIMES DAILY
Qty: 200 EACH | Refills: 2 | Status: SHIPPED | OUTPATIENT
Start: 2024-05-13

## 2024-05-13 NOTE — TELEPHONE ENCOUNTER
Specialty Pharmacy Patient Management Program  Prescription Refill Request     Patient currently fills medications at  Pharmacy. Needing refill(s) on the following:      Patient requested a prescription for a continuous glucose monitor. Based on a test claim, FSL3 is $0 on his insurance. Additionally, patient is now on a basal-bolus regimen and is administering 4 doses of insulin. Updating prescription for pen needles from once daily to 4 times daily.    Requested Prescriptions     Pending Prescriptions Disp Refills    Continuous Glucose Sensor (FreeStyle Mike 3 Sensor) misc 2 each 3     Sig: Use 1 each Every 14 (Fourteen) Days.    Continuous Glucose  (FreeStyle Mike 3 [place]) device 1 each 0     Sig: Use 1 each Take As Directed.    Insulin Pen Needle (Pen Needles) 31G X 5 MM misc 200 each 2     Sig: Use 1 each 4 (Four) Times a Day.     Last visit: 5/2/24  Next visit: 8/2/24    Pended for Dr. Nokhbehzaeim to review, and approve if appropriate.       Shauna Botello, PharmD  Clinical Specialty Pharmacist, Endocrinology  5/13/2024  09:22 EDT

## 2024-05-13 NOTE — TELEPHONE ENCOUNTER
Appointment Request From: Guilherme Garcia     With Provider: Nakia Vazquez [[facility]]     Preferred Date Range: Any date 5/13/2024 or later     Preferred Times: Any Time     Reason for visit: Follow-up     Comments:  Need to get a colonoscopy scheduled

## 2024-05-13 NOTE — TELEPHONE ENCOUNTER
Pt reviewed results via "[employer].     Sent pt "[employer] msg advising of results and recommendations. Advised to call if any questions.

## 2024-05-13 NOTE — TELEPHONE ENCOUNTER
Nakia Vazquez, TODD  [facility]  Please inform patient ultrasound shows hepatic steatosis or fatty liver disease.  Looks like recent LFTs are normal.  Treatment includes low-fat diet, increase cardiovascular exercise and weight loss to achieve an ideal BMI over the next 3 years.  Make sure he gets a follow-up in the office as well.

## 2024-05-13 NOTE — PROGRESS NOTES
Specialty Pharmacy Patient Management Program  One-Time Clinical Outreach     Guilherme Garcia is a 44 y.o. male seen by an Endocrinology provider for Type 2 Diabetes and enrolled in the Endocrinology Patient Management program offered by [facility].      Called to follow up on insulin tolerability. Patient explained that his blood sugars were not being controlled on Lantus alone, so Dr. Nokhbehzaeim added humalog 3 units before meals. Patient says that his sugars are better since starting. Patient denies any issues with insulin administration or low blood sugars. Discussed when he should test his blood sugar as it sounded like he was testing about an hour after eating. Advised for patient to test before meals and at bedtime to allow for at least 2 hours after a meal before checking his sugar.   Additionally, patient inquired about a CGM. I processed a test claim and it would be $0 for freestyle preston 3 sensors. A prescription was pended to provider and approved. Will ship out sensor and reader to patient.   Updated pen needle prescription as patient is now injecting insulin 4 times daily rather than once.      Specialty Pharmacy Patient Management Program  Endocrinology Refill Outreach      Guilherme is a 44 y.o. male contacted today regarding refills of his medication(s).    Specialty medication(s) and dose(s) confirmed: yes    Refill Questions      Flowsheet Row Most Recent Value   Changes to allergies? No   Changes to medications? No   New conditions or infections since last clinic visit No   Unplanned office visit, urgent care, ED, or hospital admission in the last 4 weeks  No   How does patient/caregiver feel medication is working? Good   Financial problems or insurance changes  No   Since the previous refill, were any specialty medication doses or scheduled injections missed or delayed?  No   Does this patient require a clinical escalation to a pharmacist? No          Delivery Questions       Flowsheet Row Most Recent Value   Delivery method Beeline   Delivery address verified with patient/caregiver? Yes   Delivery address Home   Number of medications in delivery 4   Medication(s) being filled and delivered Insulin Pen Needle, Glucose Blood, Continuous Glucose , Continuous Glucose Sensor   Copay verified? Yes   Copay amount 0   Copay form of payment No copayment ($0)          Sensor and reader to be delivered 5/14. Test strips to be delivered 5/16. Pen needles to be delivered 5/23.     Follow-Up: 25 days        Shauna Botello, PharmD  Clinical Specialty Pharmacist, Endocrinology  5/13/2024  10:39 EDT

## 2024-05-15 ENCOUNTER — SPECIALTY PHARMACY (OUTPATIENT)
Dept: ENDOCRINOLOGY | Age: 44
End: 2024-05-15
Payer: COMMERCIAL

## 2024-05-15 NOTE — PROGRESS NOTES
Specialty Pharmacy Patient Management Program  One-Time Clinical Outreach     Guilherme Garcia is a 44 y.o. male seen by an Endocrinology provider for Type 2 Diabetes and enrolled in the Endocrinology Patient Management program offered by [facility].      Insurance is limiting test strips to 50 strips per 30 days, likely because patient recently had a CGM filled. A prior authorization has been submitted and we are awaiting a response. CMM: Key: [id number].    PA denied because patient now has a CGM. Patient has been notified. Advised patient to let us know if he is needing to use strips more often.    Shauna Botello, PharmD  Clinical Specialty Pharmacist, Endocrinology  5/15/2024  09:07 EDT
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
## 2024-05-16 ENCOUNTER — TELEMEDICINE (OUTPATIENT)
Dept: PSYCHIATRY | Facility: CLINIC | Age: 44
End: 2024-05-16
Payer: COMMERCIAL

## 2024-05-16 ENCOUNTER — TELEPHONE (OUTPATIENT)
Dept: ENDOCRINOLOGY | Age: 44
End: 2024-05-16
Payer: COMMERCIAL

## 2024-05-16 DIAGNOSIS — F51.05 INSOMNIA DUE TO MENTAL CONDITION: ICD-10-CM

## 2024-05-16 DIAGNOSIS — F41.0 PANIC DISORDER WITHOUT AGORAPHOBIA: ICD-10-CM

## 2024-05-16 DIAGNOSIS — F43.10 POST TRAUMATIC STRESS DISORDER (PTSD): Primary | ICD-10-CM

## 2024-05-16 RX ORDER — MIRTAZAPINE 15 MG/1
15 TABLET, FILM COATED ORAL NIGHTLY PRN
Qty: 30 TABLET | Refills: 0 | Status: SHIPPED | OUTPATIENT
Start: 2024-05-16

## 2024-05-16 RX ORDER — PROPRANOLOL HYDROCHLORIDE 10 MG/1
10 TABLET ORAL 2 TIMES DAILY PRN
Qty: 60 TABLET | Refills: 0 | Status: SHIPPED | OUTPATIENT
Start: 2024-05-16

## 2024-05-16 NOTE — TELEPHONE ENCOUNTER
Patient called regarding his blood sugars    States that his blood sugar is running high  His blood sugar was 296 after breakfast  Instructed to cut back on carbs watch his diet  Can see dietitian, would like to try on his own first  Instructed to increase the dose of the Humalog to 5 units with meals, take it 15 minutes before each meals  Can gradually adjust the dose of the Lantus based off  fasting blood sugar  Patient voiced understanding

## 2024-05-16 NOTE — PROGRESS NOTES
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  0 - 44 IU/L Final    Hemoglobin A1C 02/12/2024 12.7 (H)  4.8 - 5.6 % Final    Comment:          Prediabetes: 5.7 - 6.4           Diabetes: >6.4           Glycemic control for adults with diabetes: <7.0      Creatine Kinase 02/12/2024 60  49 - 439 U/L Final    Total Cholesterol 02/12/2024 209 (H)  100 - 199 mg/dL Final    Triglycerides 02/12/2024 281 (H)  0 - 149 mg/dL Final    HDL Cholesterol 02/12/2024 34 (L)  >39 mg/dL Final    VLDL Cholesterol Huang 02/12/2024 50 (H)  5 - 40 mg/dL Final    LDL Chol Calc (NIH) 02/12/2024 125 (H)  0 - 99 mg/dL Final    LDL/HDL RATIO 02/12/2024 3.7 (H)  0.0 - 3.6 ratio Final    Comment:                                     LDL/HDL Ratio                                              Men  Women                                1/2 Avg.Risk  1.0    1.5                                    Avg.Risk  3.6    3.2                                 2X Avg.Risk  6.2    5.0                                 3X Avg.Risk  8.0    6.1      TIBC 02/12/2024 321  250 - 450 ug/dL Final    UIBC 02/12/2024 243  111 - 343 ug/dL Final    Iron 02/12/2024 78  38 - 169 ug/dL Final    Iron Saturation 02/12/2024 24  15 - 55 % Final    Ferritin 02/12/2024 284  30 - 400 ng/mL Final    Vitamin B-12 02/12/2024 733  232 - 1,245 pg/mL Final     Folate 02/12/2024 14.2  >3.0 ng/mL Final    Comment: A serum folate concentration of less than 3.1 ng/mL is  considered to represent clinical deficiency.      25 Hydroxy, Vitamin D 02/12/2024 17.1 (L)  30.0 - 100.0 ng/mL Final    Comment: Vitamin D deficiency has been defined by the [employer] of  [employer] and an Endocrine Society practice guideline as a  level of serum 25-OH vitamin D less than 20 ng/mL (1,2).  The Endocrine Society went on to further define vitamin D  insufficiency as a level between 21 and 29 ng/mL (2).  1. IOM ([employer]). 2010. Dietary reference     intakes for calcium and D. [place]: The     National Academies Press.  2. Jens MF, Sveta MONTESINOS, Butch ARNOLD, et al.     Evaluation, treatment, and prevention of vitamin D     deficiency: an Endocrine Society clinical practice     guideline. JCEM. 2011 Jul; 96(7):1911-30.      TSH 02/12/2024 1.680  0.450 - 4.500 uIU/mL Final    Free T4 02/12/2024 1.20  0.82 - 1.77 ng/dL Final    T3, Free 02/12/2024 3.2  2.0 - 4.4 pg/mL Final    Uric Acid 02/12/2024 5.5  3.8 - 8.4 mg/dL Final               Therapeutic target for gout patients: <6.0    Specific Gravity, UA 02/12/2024 1.018  1.005 - 1.030 Final    pH, UA 02/12/2024 7.5  5.0 - 7.5 Final    Color, UA 02/12/2024 Yellow  Yellow Final    Appearance, UA 02/12/2024 Clear  Clear Final    Leukocytes, UA 02/12/2024 Negative  Negative Final    Protein 02/12/2024 Negative  Negative/Trace Final    Glucose, UA 02/12/2024 Negative  Negative Final    Ketones 02/12/2024 Negative  Negative Final    Blood, UA 02/12/2024 Negative  Negative Final    Bilirubin, UA 02/12/2024 Negative  Negative Final    Urobilinogen, UA 02/12/2024 0.2  0.2 - 1.0 mg/dL Final    Nitrite, UA 02/12/2024 Negative  Negative Final    Microscopic Examination 02/12/2024 Comment   Final    Microscopic follows if indicated.    Microscopic Examination 02/12/2024 See below:   Final    Microscopic was indicated and was performed.     Urinalysis Reflex 02/12/2024 Comment   Final    This specimen will not reflex to a Urine Culture.    Hepatitis B Surface Ag 02/12/2024 Negative  Negative Final    HBV IU/mL 02/12/2024 HBV DNA not detected  IU/mL Final    log10 HBV IU/mL 02/12/2024 CANCELED  log10 IU/mL Final-Edited    Comment: Unable to calculate result since non-numeric result obtained for  component test.    Result canceled by the ancillary.      Test Information 02/12/2024 Comment   Final    The reportable range for this assay is 10 IU/mL to 1 billion IU/mL.    HBV Genotype 02/12/2024 CANCELED   Final-Edited    Comment: Not indicated    Result canceled by the ancillary.      WBC, UA 02/12/2024 None seen  0 - 5 /hpf Final    RBC, UA 02/12/2024 None seen  0 - 2 /hpf Final    Epithelial Cells (non renal) 02/12/2024 None seen  0 - 10 /hpf Final    Casts 02/12/2024 None seen  None seen /lpf Final    Bacteria, UA 02/12/2024 None seen  None seen/Few Final   Admission on 02/03/2024, Discharged on 02/03/2024   Component Date Value Ref Range Status    Glucose 02/03/2024 314 (H)  70 - 130 mg/dL Final    Glucose 02/03/2024 306 (H)  65 - 99 mg/dL Final    BUN 02/03/2024 7  6 - 20 mg/dL Final    Creatinine 02/03/2024 1.00  0.76 - 1.27 mg/dL Final    Sodium 02/03/2024 133 (L)  136 - 145 mmol/L Final    Potassium 02/03/2024 3.4 (L)  3.5 - 5.2 mmol/L Final    Slight hemolysis detected by analyzer. Result may be falsely elevated.    Chloride 02/03/2024 94 (L)  98 - 107 mmol/L Final    CO2 02/03/2024 25.0  22.0 - 29.0 mmol/L Final    Calcium 02/03/2024 9.3  8.6 - 10.5 mg/dL Final    Total Protein 02/03/2024 7.2  6.0 - 8.5 g/dL Final    Albumin 02/03/2024 4.4  3.5 - 5.2 g/dL Final    ALT (SGPT) 02/03/2024 56 (H)  1 - 41 U/L Final    AST (SGOT) 02/03/2024 43 (H)  1 - 40 U/L Final    Alkaline Phosphatase 02/03/2024 65  39 - 117 U/L Final    Total Bilirubin 02/03/2024 0.4  0.0 - 1.2 mg/dL Final    Globulin 02/03/2024 2.8  gm/dL Final    A/G Ratio 02/03/2024 1.6   g/dL Final    BUN/Creatinine Ratio 02/03/2024 7.0  7.0 - 25.0 Final    Anion Gap 02/03/2024 14.0  5.0 - 15.0 mmol/L Final    eGFR 02/03/2024 94.0  >60.0 mL/min/1.73 Final    Color, UA 02/03/2024 Yellow  Yellow, Straw Final    Appearance, UA 02/03/2024 Clear  Clear Final    pH, UA 02/03/2024 6.5  5.0 - 8.0 Final    Specific Gravity, UA 02/03/2024 1.026  1.005 - 1.030 Final    Glucose, UA 02/03/2024 >=1000 mg/dL (3+) (A)  Negative Final    Ketones, UA 02/03/2024 Negative  Negative Final    Bilirubin, UA 02/03/2024 Negative  Negative Final    Blood, UA 02/03/2024 Negative  Negative Final    Protein, UA 02/03/2024 Negative  Negative Final    Leuk Esterase, UA 02/03/2024 Negative  Negative Final    Nitrite, UA 02/03/2024 Negative  Negative Final    Urobilinogen, UA 02/03/2024 0.2 E.U./dL  0.2 - 1.0 E.U./dL Final    WBC 02/03/2024 6.02  3.40 - 10.80 10*3/mm3 Final    RBC 02/03/2024 4.81  4.14 - 5.80 10*6/mm3 Final    Hemoglobin 02/03/2024 15.1  13.0 - 17.7 g/dL Final    Hematocrit 02/03/2024 45.1  37.5 - 51.0 % Final    MCV 02/03/2024 93.8  79.0 - 97.0 fL Final    MCH 02/03/2024 31.4  26.6 - 33.0 pg Final    MCHC 02/03/2024 33.5  31.5 - 35.7 g/dL Final    RDW 02/03/2024 13.7  12.3 - 15.4 % Final    RDW-SD 02/03/2024 48.0  37.0 - 54.0 fl Final    MPV 02/03/2024 9.7  6.0 - 12.0 fL Final    Platelets 02/03/2024 196  140 - 450 10*3/mm3 Final    Neutrophil % 02/03/2024 50.4  42.7 - 76.0 % Final    Lymphocyte % 02/03/2024 39.5  19.6 - 45.3 % Final    Monocyte % 02/03/2024 8.1  5.0 - 12.0 % Final    Eosinophil % 02/03/2024 1.5  0.3 - 6.2 % Final    Basophil % 02/03/2024 0.3  0.0 - 1.5 % Final    Immature Grans % 02/03/2024 0.2  0.0 - 0.5 % Final    Neutrophils, Absolute 02/03/2024 3.03  1.70 - 7.00 10*3/mm3 Final    Lymphocytes, Absolute 02/03/2024 2.38  0.70 - 3.10 10*3/mm3 Final    Monocytes, Absolute 02/03/2024 0.49  0.10 - 0.90 10*3/mm3 Final    Eosinophils, Absolute 02/03/2024 0.09  0.00 - 0.40 10*3/mm3 Final    Basophils,  Absolute 02/03/2024 0.02  0.00 - 0.20 10*3/mm3 Final    Immature Grans, Absolute 02/03/2024 0.01  0.00 - 0.05 10*3/mm3 Final    nRBC 02/03/2024 0.0  0.0 - 0.2 /100 WBC Final    Beta-Hydroxybutyrate Quant 02/03/2024 0.154  0.020 - 0.270 mmol/L Final    Glucose 02/03/2024 307 (H)  70 - 130 mg/dL Final       Assessment & Plan   Problems Addressed this Visit          Mental Health    Post traumatic stress disorder (PTSD) - Primary    Relevant Medications    mirtazapine (Remeron) 15 MG tablet    sertraline (Zoloft) 50 MG tablet    propranolol (INDERAL) 10 MG tablet    Panic disorder without agoraphobia    Relevant Medications    mirtazapine (Remeron) 15 MG tablet    sertraline (Zoloft) 50 MG tablet    propranolol (INDERAL) 10 MG tablet    Insomnia due to mental condition    Relevant Medications    mirtazapine (Remeron) 15 MG tablet    sertraline (Zoloft) 50 MG tablet     Diagnoses         Codes Comments    Post traumatic stress disorder (PTSD)    -  Primary ICD-10-CM: F43.10  ICD-9-CM: 309.81     Panic disorder without agoraphobia     ICD-10-CM: F41.0  ICD-9-CM: 300.01     Insomnia due to mental condition     ICD-10-CM: F51.05  ICD-9-CM: 300.9, 327.02             Visit Diagnoses:    ICD-10-CM ICD-9-CM   1. Post traumatic stress disorder (PTSD)  F43.10 309.81   2. Panic disorder without agoraphobia  F41.0 300.01   3. Insomnia due to mental condition  F51.05 300.9     327.02     Discussed treatment options with patient.  Discussed with patient that the clinical impression of this IPR gets is that he has PTSD, panic disorder, and insomnia.  Discussed with patient that we will avoid any type of benzodiazepine due to him also being on methadone as that combination is contraindicated.  Patient verbalized understanding.  Discussed different treatment options with patient.  Discussed with patient that we could start an antidepressant to target his worry and overthinking symptoms as well as his panic.  He would like to do so.   Discussed different treatment options and mutually agreed to start Zoloft 25 mg daily for 1 week, then increase to 50 mg daily for anxiety and panic.  Discussed patient's sleep.  Discussed Remeron with patient and he would like to try the medication.  Did discuss with patient that lower doses of Remeron are more sedating, so he should take the entire dose and not break the tablet in half.  Patient agreeable and would like to start the medication.  Start Remeron 15 mg nightly as needed for sleep.  Discussed propranolol with patient.  Discussed with patient that since he is a diabetic that he should know that propranolol could mask the symptoms of hypoglycemia.  Patient states he wears a continuous monitor and alerts him if he ever has any low blood sugar, but that is not something that he normally deals with as his sugar usually runs high.  Discussed with patient that propranolol blocks the fight or flight response within the body and that is a good option to use in situations where he knows is going to cause him increased panic and anxiety symptoms.  Discussed with patient that this is a blood pressure medication, so he should also monitor his blood pressure when taking the medication.  Patient agreeable and states he would like to try the medication.  Start propranolol 10 mg twice daily as needed for anxiety.  Encouraged patient to do a test run with the medication to see how it affects him before relying on it and operating any type of machinery.  Patient verbalized understanding and agreed.  We will see patient again in 4 weeks to reassess.  Patient also is going to see [facility] for therapy.  Encouraged patient to contact the office if he has any issues sooner.    TREATMENT PLAN/GOALS: Continue supportive psychotherapy efforts and medications as indicated. Treatment and medication options discussed during today's visit. Patient acknowledged and verbally consented to continue with current treatment plan  and was educated on the importance of compliance with treatment and follow-up appointments.    Short Term Goals: Patient will be compliant with medication, and patient will have no significant medication related side effects.  Patient will be engaged in psychotherapy as indicated.  Patient will report subjective improvement of symptoms.    Long term goals: To stabilize mood and treat/improve subjective symptoms, the patient will stay out of the hospital, the patient will be at an optimal level of functioning, and the patient will take all medications as prescribed.  The patient verbalized understanding and agreement with goals that were mutually set.    MEDICATION ISSUES:    Discussed medication options and treatment plan of prescribed medication as well as the risks, benefits, and side effects including potential falls, possible impaired driving and metabolic adversities among others. Patient is agreeable to call the office with any worsening of symptoms or onset of side effects. Patient is agreeable to call 911 or go to the nearest ER should he/she begin having SI/HI. If patient has any concerns or needs assistance they were instructed to call the [facility] at [phone].    MEDS ORDERED DURING VISIT:  New Medications Ordered This Visit   Medications    mirtazapine (Remeron) 15 MG tablet     Sig: Take 1 tablet by mouth At Night As Needed (sleep).     Dispense:  30 tablet     Refill:  0    sertraline (Zoloft) 50 MG tablet     Sig: Take 0.5 tablets by mouth Daily for 7 days, THEN 1 tablet Daily for 23 days.     Dispense:  27 tablet     Refill:  0    propranolol (INDERAL) 10 MG tablet     Sig: Take 1 tablet by mouth 2 (Two) Times a Day As Needed (anxiety).     Dispense:  60 tablet     Refill:  0       Return in about 4 weeks (around 6/13/2024) for Video visit.             This document has been electronically signed by TODD Pimentel  May 16, 2024 18:29 EDT    Part of this note  may be an electronic transmission of spoken language to printed text using the Dragon Dictation System.

## 2024-05-21 ENCOUNTER — TELEPHONE (OUTPATIENT)
Dept: GASTROENTEROLOGY | Facility: CLINIC | Age: 44
End: 2024-05-21
Payer: COMMERCIAL

## 2024-05-21 ENCOUNTER — OFFICE VISIT (OUTPATIENT)
Dept: NEUROSURGERY | Facility: CLINIC | Age: 44
End: 2024-05-21
Payer: COMMERCIAL

## 2024-05-21 VITALS
DIASTOLIC BLOOD PRESSURE: 70 MMHG | OXYGEN SATURATION: 96 % | BODY MASS INDEX: 28.02 KG/M2 | SYSTOLIC BLOOD PRESSURE: 106 MMHG | WEIGHT: 189.8 LBS | HEART RATE: 83 BPM

## 2024-05-21 DIAGNOSIS — Z12.11 ENCOUNTER FOR SCREENING FOR MALIGNANT NEOPLASM OF COLON: Primary | ICD-10-CM

## 2024-05-21 DIAGNOSIS — R20.2 RIGHT LEG PARESTHESIAS: Primary | ICD-10-CM

## 2024-05-21 DIAGNOSIS — R20.2 BILATERAL LEG PARESTHESIA: ICD-10-CM

## 2024-05-21 DIAGNOSIS — S32.009A CLOSED FRACTURE OF TRANSVERSE PROCESS OF LUMBAR VERTEBRA, INITIAL ENCOUNTER: ICD-10-CM

## 2024-05-21 RX ORDER — COLCHICINE 0.6 MG/1
0.6 TABLET ORAL DAILY
COMMUNITY
End: 2024-05-24 | Stop reason: SDUPTHER

## 2024-05-21 RX ORDER — POLYETHYLENE GLYCOL 3350 17 G/17G
17 POWDER, FOR SOLUTION ORAL DAILY
COMMUNITY

## 2024-05-21 RX ORDER — GABAPENTIN 300 MG/1
300 CAPSULE ORAL NIGHTLY
Qty: 30 CAPSULE | Refills: 1 | Status: SHIPPED | OUTPATIENT
Start: 2024-05-21

## 2024-05-21 RX ORDER — SODIUM CHLORIDE, SODIUM LACTATE, POTASSIUM CHLORIDE, CALCIUM CHLORIDE 600; 310; 30; 20 MG/100ML; MG/100ML; MG/100ML; MG/100ML
30 INJECTION, SOLUTION INTRAVENOUS CONTINUOUS
OUTPATIENT
Start: 2024-05-21

## 2024-05-21 RX ORDER — CYCLOBENZAPRINE HCL 10 MG
10 TABLET ORAL 2 TIMES DAILY PRN
Qty: 60 TABLET | Refills: 1 | Status: SHIPPED | OUTPATIENT
Start: 2024-05-21

## 2024-05-21 NOTE — TELEPHONE ENCOUNTER
JOHN PAUL José  for COLONOSCOPY on 10/25/2024  arrive at 8:00  . Sent prep instructions to pt my chart....miralax

## 2024-05-21 NOTE — TELEPHONE ENCOUNTER
NO PERSONAL HX OF POLYPS     FAMILY HX OF POLYPS     FAMILY HX OF COLON CA            LIST OF  MEDICATIONS    LOVENOX  IBUPROFEN  METHADONE  INSULIN  ADVIL            OA QUESTIONNAIRE SCANNED IN MEDIA

## 2024-05-21 NOTE — TELEPHONE ENCOUNTER
Hub staff attempted to follow warm transfer process and was unsuccessful     Caller: Guilherme Garcia    Relationship to patient: SelfSELF    Best call back number: [phone]    Patient is needing: TO GET COLONOSCOPY SCHEDULED PT STATED THAT SOMEONE CALLED [facility] AND THAT HE WANTED THE SPOT THEY MENTIONED ON THE MESSAGE

## 2024-05-21 NOTE — TELEPHONE ENCOUNTER
Hub staff attempted to follow warm transfer process and was unsuccessful     Caller: DORA PUTNAM    Relationship to patient: SELF    Best call back number: [phone]    Patient is needing: TO GET COLONOSCOPY SCHEDULED PT STATED THAT SOMEONE CALLED [facility] AND THAT HE WANTED THE SPOT THEY MENTIONED ON THE MESSAGE

## 2024-05-24 ENCOUNTER — HOSPITAL ENCOUNTER (OUTPATIENT)
Dept: CT IMAGING | Facility: HOSPITAL | Age: 44
Discharge: HOME | End: 2024-05-24
Payer: COMMERCIAL

## 2024-05-24 ENCOUNTER — PATIENT ROUNDING (BHMG ONLY) (OUTPATIENT)
Dept: NEUROSURGERY | Facility: CLINIC | Age: 44
End: 2024-05-24
Payer: COMMERCIAL

## 2024-05-24 DIAGNOSIS — R04.2 SPITTING UP BLOOD: ICD-10-CM

## 2024-05-24 DIAGNOSIS — E11.65 UNCONTROLLED TYPE 2 DIABETES MELLITUS WITH HYPERGLYCEMIA: ICD-10-CM

## 2024-05-24 PROCEDURE — 71250 CT THORAX DX C-: CPT

## 2024-05-24 PROCEDURE — 74170 CT ABD WO CNTRST FLWD CNTRST: CPT

## 2024-05-24 PROCEDURE — 25510000001 IOPAMIDOL 61 % SOLUTION: Performed by: STUDENT IN AN ORGANIZED HEALTH CARE EDUCATION/TRAINING PROGRAM

## 2024-05-24 PROCEDURE — 0 DIATRIZOATE MEGLUMINE & SODIUM PER 1 ML: Performed by: STUDENT IN AN ORGANIZED HEALTH CARE EDUCATION/TRAINING PROGRAM

## 2024-05-24 RX ORDER — COLCHICINE 0.6 MG/1
0.6 TABLET ORAL DAILY
Qty: 3 TABLET | Refills: 0 | Status: SHIPPED | OUTPATIENT
Start: 2024-05-24

## 2024-05-24 RX ORDER — POLYETHYLENE GLYCOL 3350 17 G/17G
17 POWDER, FOR SOLUTION ORAL DAILY
Qty: 90 EACH | Refills: 0 | OUTPATIENT
Start: 2024-05-24

## 2024-05-24 RX ADMIN — IOPAMIDOL 85 ML: 612 INJECTION, SOLUTION INTRAVENOUS at 11:18

## 2024-05-24 RX ADMIN — DIATRIZOATE MEGLUMINE AND DIATRIZOATE SODIUM 30 ML: 660; 100 LIQUID ORAL; RECTAL at 10:00

## 2024-05-24 NOTE — TELEPHONE ENCOUNTER
GI medications should be prescribed by GI, as he is seeing the specialist.    I have given 1 round of colchicine.  If he does not have resolution or if he has recurrence, he needs to be seen in person for this.

## 2024-05-29 DIAGNOSIS — R91.1 PULMONARY NODULE: Primary | ICD-10-CM

## 2024-05-31 ENCOUNTER — TELEPHONE (OUTPATIENT)
Dept: NEUROSURGERY | Facility: CLINIC | Age: 44
End: 2024-05-31
Payer: COMMERCIAL

## 2024-05-31 NOTE — TELEPHONE ENCOUNTER
"Message to tootie RE: neurology referral, informed per Beverly in neurology,    28 mins     \"Call [phone] and ask for Lulu, she does our new patient referrals\"    Tootie expressed understanding   "

## 2024-06-03 ENCOUNTER — OFFICE VISIT (OUTPATIENT)
Dept: SLEEP MEDICINE | Facility: HOSPITAL | Age: 44
End: 2024-06-03
Payer: COMMERCIAL

## 2024-06-03 VITALS
OXYGEN SATURATION: 96 % | WEIGHT: 194 LBS | HEART RATE: 77 BPM | SYSTOLIC BLOOD PRESSURE: 112 MMHG | BODY MASS INDEX: 28.73 KG/M2 | HEIGHT: 69 IN | DIASTOLIC BLOOD PRESSURE: 75 MMHG

## 2024-06-03 DIAGNOSIS — R06.81 WITNESSED EPISODE OF APNEA: ICD-10-CM

## 2024-06-03 DIAGNOSIS — G47.30 OBSERVED SLEEP APNEA: Primary | ICD-10-CM

## 2024-06-03 DIAGNOSIS — R06.83 LOUD SNORING: ICD-10-CM

## 2024-06-03 PROCEDURE — G0463 HOSPITAL OUTPT CLINIC VISIT: HCPCS
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
## 2024-06-05 ENCOUNTER — SPECIALTY PHARMACY (OUTPATIENT)
Dept: ENDOCRINOLOGY | Age: 44
End: 2024-06-05
Payer: COMMERCIAL

## 2024-06-05 NOTE — PROGRESS NOTES
Specialty Pharmacy Refill Coordination Note     Guilherme is a 44 y.o. male contacted today regarding refills of  1 specialty medication(s).    Reviewed and verified with patient:       Specialty medication(s) and dose(s) confirmed: yes    Refill Questions      Flowsheet Row Most Recent Value   Changes to allergies? No   Changes to medications? No   New conditions or infections since last clinic visit No   Unplanned office visit, urgent care, ED, or hospital admission in the last 4 weeks  No   How does patient/caregiver feel medication is working? Good   Financial problems or insurance changes  No   Since the previous refill, were any specialty medication doses or scheduled injections missed or delayed?  No   Does this patient require a clinical escalation to a pharmacist? No            Delivery Questions      Flowsheet Row Most Recent Value   Delivery method Beeline   Delivery address verified with patient/caregiver? Yes   Delivery address Home   Number of medications in delivery 1   Medication(s) being filled and delivered Continuous Glucose Sensor   Doses left of specialty medications 1   Copay verified? Yes   Copay amount 0   Copay form of payment No copayment ($0)                   Follow-up: 25 day(s)     Lanny Wilkins, Pharmacy Technician  Specialty Pharmacy Technician

## 2024-06-11 ENCOUNTER — HOSPITAL ENCOUNTER (OUTPATIENT)
Dept: SLEEP MEDICINE | Facility: HOSPITAL | Age: 44
End: 2024-06-11
Payer: COMMERCIAL

## 2024-06-11 DIAGNOSIS — G47.30 OBSERVED SLEEP APNEA: ICD-10-CM

## 2024-06-11 DIAGNOSIS — R06.83 LOUD SNORING: ICD-10-CM

## 2024-06-11 DIAGNOSIS — R06.81 WITNESSED EPISODE OF APNEA: ICD-10-CM

## 2024-06-11 PROCEDURE — 95806 SLEEP STUDY UNATT&RESP EFFT: CPT

## 2024-06-12 ENCOUNTER — TELEMEDICINE (OUTPATIENT)
Dept: PSYCHIATRY | Facility: CLINIC | Age: 44
End: 2024-06-12
Payer: COMMERCIAL

## 2024-06-12 DIAGNOSIS — F41.0 PANIC DISORDER WITHOUT AGORAPHOBIA: Primary | Chronic | ICD-10-CM

## 2024-06-12 DIAGNOSIS — F43.10 POST TRAUMATIC STRESS DISORDER (PTSD): Chronic | ICD-10-CM

## 2024-06-12 DIAGNOSIS — F51.05 INSOMNIA DUE TO MENTAL CONDITION: ICD-10-CM

## 2024-06-12 RX ORDER — SERTRALINE HYDROCHLORIDE 100 MG/1
100 TABLET, FILM COATED ORAL DAILY
Qty: 90 TABLET | Refills: 0 | Status: SHIPPED | OUTPATIENT
Start: 2024-06-12

## 2024-06-12 RX ORDER — MIRTAZAPINE 15 MG/1
15 TABLET, FILM COATED ORAL NIGHTLY PRN
Qty: 90 TABLET | Refills: 0 | Status: SHIPPED | OUTPATIENT
Start: 2024-06-12

## 2024-06-13 ENCOUNTER — OFFICE VISIT (OUTPATIENT)
Dept: NEUROLOGY | Facility: CLINIC | Age: 44
End: 2024-06-13
Payer: COMMERCIAL

## 2024-06-13 VITALS
HEART RATE: 112 BPM | DIASTOLIC BLOOD PRESSURE: 80 MMHG | BODY MASS INDEX: 28.57 KG/M2 | SYSTOLIC BLOOD PRESSURE: 118 MMHG | WEIGHT: 193.6 LBS | OXYGEN SATURATION: 94 %

## 2024-06-13 DIAGNOSIS — E27.8 LEFT ADRENAL MASS: ICD-10-CM

## 2024-06-13 DIAGNOSIS — R20.2 RIGHT LEG PARESTHESIAS: ICD-10-CM

## 2024-06-13 DIAGNOSIS — R20.2 BILATERAL LEG PARESTHESIA: ICD-10-CM

## 2024-06-13 PROCEDURE — 99204 OFFICE O/P NEW MOD 45 MIN: CPT | Performed by: PSYCHIATRY & NEUROLOGY

## 2024-06-13 RX ORDER — GABAPENTIN 300 MG/1
300 CAPSULE ORAL 3 TIMES DAILY
Qty: 90 CAPSULE | Refills: 3 | Status: SHIPPED | OUTPATIENT
Start: 2024-06-13
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
## 2024-06-14 ENCOUNTER — SPECIALTY PHARMACY (OUTPATIENT)
Dept: ENDOCRINOLOGY | Age: 44
End: 2024-06-14
Payer: COMMERCIAL

## 2024-06-14 ENCOUNTER — PATIENT ROUNDING (BHMG ONLY) (OUTPATIENT)
Dept: NEUROLOGY | Facility: CLINIC | Age: 44
End: 2024-06-14
Payer: COMMERCIAL

## 2024-06-14 RX ORDER — INSULIN LISPRO 100 [IU]/ML
12 INJECTION, SOLUTION INTRAVENOUS; SUBCUTANEOUS
Qty: 45 ML | Refills: 0 | Status: SHIPPED | OUTPATIENT
Start: 2024-06-14

## 2024-06-14 RX ORDER — INSULIN LISPRO 100 [IU]/ML
5 INJECTION, SOLUTION INTRAVENOUS; SUBCUTANEOUS 3 TIMES DAILY
Qty: 15 ML | Refills: 1 | Status: SHIPPED | OUTPATIENT
Start: 2024-06-14 | End: 2024-06-14 | Stop reason: DRUGHIGH

## 2024-06-14 RX ORDER — INSULIN DEGLUDEC 100 U/ML
24 INJECTION, SOLUTION SUBCUTANEOUS DAILY
Qty: 30 ML | Refills: 0 | Status: SHIPPED | OUTPATIENT
Start: 2024-06-14

## 2024-06-14 NOTE — PROGRESS NOTES
Specialty Pharmacy Patient Management Program  One-Time Clinical Outreach     Guilherme Garcia is a 44 y.o. male seen by an Endocrinology provider for Type 2 Diabetes and enrolled in the Endocrinology Patient Management program offered by [facility].      A one time call was made when patient requested short acting insulin but should not have needed for another month based on most recent directions. Patient stated that he has been using additional insulin in order to maintain blood sugar at a level he feels comfortable. Patient has been using Humalog 8-10 units TID with meals and Lantus 12 units in the morning and 8 units at night. He reports that in the morning his blood sugar is about 250 and by lunch time is closer to 150-180.  Information will be given to Dr. Nokhbehzaeim for her to adjust insulin regimen as needed.     Patient connected Mike to the office allowing clinic staff and provider to have access to CGM report. Will follow up with patient after Dr. Nokhbehzaeim informs us of any changes. Patient is aware to call with any questions or concerns.     Open Medication Therapy Problems  No medication therapy recommendations to display    Dana Nayak, Jeremy, MM  Clinical Specialty Pharmacist, Endocrinology  6/14/2024  09:40 EDT

## 2024-06-14 NOTE — TELEPHONE ENCOUNTER
Specialty Pharmacy Patient Management Program  Prescription Refill Request     Patient currently fills medications at  Pharmacy. Needing refill(s) on the following:      Requested Prescriptions     Pending Prescriptions Disp Refills    insulin degludec (Tresiba FlexTouch) 100 UNIT/ML solution pen-injector injection 30 mL 0     Sig: Inject 24 Units under the skin into the appropriate area as directed Daily.    Insulin Lispro, 1 Unit Dial, (HumaLOG KwikPen) 100 UNIT/ML solution pen-injector 45 mL 0     Sig: Inject 12 Units under the skin into the appropriate area as directed 3 (Three) Times a Day With Meals.     As discussed, stop Lantus and start Tresiba 24 units daily (to prevent BID dosing) and increase Humalog to 12 units TID with meals.    Pended for Dr. Nokhbehzaeim to review, and approve if appropriate.     Dana Nayak, PharmD, MM   Clinical Speciality Pharmacist, Endocrinology   6/14/2024  11:23 EDT

## 2024-06-14 NOTE — PROGRESS NOTES
Specialty Pharmacy Patient Management Program  Endocrinology Medication Addition Assessment     Guilherme Garcia was referred by an Endocrinology provider to the Endocrinology Patient Management Program offered by [facility] for Type 2 Diabetes. This assessment was conducted as a result of a specialty medication addition or substitution. The patient was previously introduced to services offered by [facility], including: regular assessments, refill coordination, curbside pick-up or mail order delivery options, prior authorization maintenance, and financial assistance programs as applicable. The patient was also provided with contact information for the pharmacy team.      An initial outreach was conducted, including assessment of therapy appropriateness and specialty medication education for Tresiba.     A follow-up outreach was conducted, including assessment of continued therapy appropriateness, medication adherence, and side effect incidence and management for Humalog.    Insurance Coverage & Financial Support  [place] Medicaid      Relevant Past Medical History and Comorbidities  Relevant medical history and concomitant health conditions were discussed with the patient. The patient's chart has been reviewed for relevant past medical history and comorbid conditions and updated as necessary.  Past Medical History:   Diagnosis Date    Bleeding in mouth     WHEN WAKES UP IN THE AM    Diabetes mellitus     Dry throat     AND IRRITATED WHEN WAKE UP MORNING    GERD (gastroesophageal reflux disease)     Hepatitis B     TOOK MEDS    History of kidney stones      Social History     Socioeconomic History    Marital status:    Tobacco Use    Smoking status: Former     Current packs/day: 0.00     Average packs/day: 1 pack/day for 27.0 years (27.0 ttl pk-yrs)     Types: Cigarettes     Start date: 1991     Quit date: 2018     Years since quittin.4    Smokeless  tobacco: Never   Vaping Use    Vaping status: Every Day    Substances: Nicotine    Devices: Refillable tank   Substance and Sexual Activity    Alcohol use: Not Currently    Drug use: Not Currently     Comment: hx of narcotic pain medication SOBER SINCE 11/2022    Sexual activity: Yes     Partners: Female     Comment: Wife       Problem list reviewed by Mckayla Nayak RPH on 6/14/2024 at  2:26 PM    Hospitalizations and Urgent Care Since Last Assessment  ED Visits, Admissions, or Hospitalizations: None reported or documented  Urgent Office Visits: None reported or documented     Allergies  Known allergies and reactions were discussed with the patient. The patient's chart has been reviewed for  allergy information and updated as necessary.   Allergies   Allergen Reactions    Metformin Diarrhea and Nausea And Vomiting            Relevant Laboratory Values  Relevant laboratory values were discussed with the patient. The following specialty medication dose adjustment(s) are recommended: No adjustments needed at this time.   A1C Last 3 Results          2/12/2024    08:25   HGBA1C Last 3 Results   Hemoglobin A1C 12.7      Lab Results   Component Value Date    HGBA1C 12.7 (H) 02/12/2024     Lab Results   Component Value Date    GLUCOSE 137 (H) 05/02/2024    CALCIUM 9.9 05/02/2024     05/02/2024    K 5.0 05/02/2024    CO2 26.8 05/02/2024     05/02/2024    BUN 18 05/02/2024    CREATININE 0.84 05/02/2024    BCR 21.4 05/02/2024    ANIONGAP 12.2 05/02/2024     Lab Results   Component Value Date    CHLPL 209 (H) 02/12/2024    TRIG 281 (H) 02/12/2024    HDL 34 (L) 02/12/2024     (H) 02/12/2024     Microalbumin          2/12/2024    08:25   Microalbumin   Microalbumin, Urine <3.0        Current Medication List  This medication list has been reviewed with the patient and evaluated for any interactions or necessary modifications/recommendations, and updated to include all prescription medications, OTC medications,  and supplements the patient is currently taking.  This list reflects what is contained in the patient's profile, which has also been marked as reviewed to communicate to other providers it is the most up to date version of the patient's current medication therapy.     Current Outpatient Medications:     Blood Glucose Monitoring Suppl (OneTouch Verio Reflect) w/Device kit, Use 1 each Daily., Disp: 1 kit, Rfl: 0    Continuous Glucose  (FreeStyle Mike 3 Dumas) device, Use 1 each Take As Directed., Disp: 1 each, Rfl: 0    Continuous Glucose Sensor (FreeStyle Mike 3 Sensor) misc, Use 1 each Every 14 (Fourteen) Days., Disp: 2 each, Rfl: 3    docusate sodium (COLACE) 50 MG capsule, Take 1 capsule by mouth 2 (Two) Times a Day., Disp: , Rfl:     gabapentin (NEURONTIN) 300 MG capsule, Take 1 capsule by mouth 3 (Three) Times a Day., Disp: 90 capsule, Rfl: 3    glucose blood test strip, Use 1 test strip 4 (Four) Times a Day. One touch verio, Disp: 100 each, Rfl: 3    insulin degludec (Tresiba FlexTouch) 100 UNIT/ML solution pen-injector injection, Inject 24 Units under the skin into the appropriate area as directed Daily., Disp: 30 mL, Rfl: 0    Insulin Lispro, 1 Unit Dial, (HumaLOG KwikPen) 100 UNIT/ML solution pen-injector, Inject 12 Units under the skin into the appropriate area as directed 3 (Three) Times a Day With Meals., Disp: 45 mL, Rfl: 0    Insulin Pen Needle (Pen Needles) 31G X 5 MM misc, Use 1 each 4 (Four) Times a Day., Disp: 200 each, Rfl: 2    Lancets misc, Use 1 lancet 4 (Four) Times a Day., Disp: 100 each, Rfl: 3    METHADONE HCL PO, Take 1 tablet by mouth Daily. FOLLOW INSTRUCTIONS PER PRESCRIBER, Disp: , Rfl:     mirtazapine (Remeron) 15 MG tablet, Take 1 tablet by mouth At Night As Needed (sleep)., Disp: 90 tablet, Rfl: 0    pantoprazole (PROTONIX) 40 MG EC tablet, Take 1 tablet by mouth Daily., Disp: 90 tablet, Rfl: 3    polyethylene glycol (MIRALAX) 17 g packet, Take 17 g by mouth Daily., Disp:  , Rfl:     propranolol (INDERAL) 10 MG tablet, Take 1 tablet by mouth 2 (Two) Times a Day As Needed (anxiety)., Disp: 60 tablet, Rfl: 0    sertraline (Zoloft) 100 MG tablet, Take 1 tablet by mouth Daily., Disp: 90 tablet, Rfl: 0  No current facility-administered medications for this visit.    Medicines reviewed by Mckayla Nayak RP on 6/14/2024 at  2:26 PM    Drug Interactions (per [employer])  Gabapentin and methadone  Avoid concomitant use of opioid agonists and benzodiazepines or other CNS depressants when possible. These agents should only be combined if alternative treatment options are inadequate. If combined, limit the dosages and duration of each drug to the minimum possible while achieving the desired clinical effect and consider dose reductions of the opioid or CNS depressant upon initiation. Warn patients and caregivers about the risk of slowed or difficult breathing and/or sedation and consider prescribing naloxone for the emergency treatment of opioid overdose. A separate FDA safety communication says patents requiring methadone or buprenorphine for treatment of opioid dependence should not be excluded from such treatment due to the concomitant use of a CNS depressant. Close monitoring for adverse effects with any combined use is warranted.  Methadone and mirtazipine  Avoid concomitant use of opioid agonists and benzodiazepines or other CNS depressants when possible. These agents should only be combined if alternative treatment options are inadequate. If combined, limit the dosages and duration of each drug to the minimum possible while achieving the desired clinical effect and consider dose reductions of the opioid or CNS depressant upon initiation. Warn patients and caregivers about the risk of slowed or difficult breathing and/or sedation and consider prescribing naloxone for the emergency treatment of opioid overdose. A separate FDA safety communication says patents requiring methadone or  buprenorphine for treatment of opioid dependence should not be excluded from such treatment due to the concomitant use of a CNS depressant. Close monitoring for adverse effects with any combined use is warranted.    Recommended Medications Assessment  Aspirin: Not Taking Currently  Statin: Not Taking Currently  ACEi/ARB: Not Taking Currently    Initial Education Provided for Specialty Medication  The patient has been provided with the following education and any applicable administration techniques (i.e. self-injection) have been demonstrated for the therapies indicated. All questions and concerns have been addressed prior to the patient receiving the medication, and the patient has verbalized comprehension of the education and any materials provided. Additional patient education shall be provided and documented upon request by the patient, provider, or payer.    TRESIBA® (insulin degludec)   Medication Expectations    Why am I taking this medication?  You are taking this medication to lower blood sugar and A1c because you have type 1 or type 2 diabetes. Diabetes is not curable but with proper medication and treatment, we can keep your blood sugar within your personalized target range.    What should I expect while on this medication?  You should expect to see your blood sugar and A1c decrease over time.    How does the medication work?  Tresiba is a long-acting insulin that slowly and steadily releases in the body for 24-hours of blood sugar control. Insulin helps the sugar in your blood get into cells and provide them with energy to fuel your body.     How long will I be on this medication for?  If you have Type 1 diabetes, you will be on this medication or another insulin throughout your lifetime because your body cannot make its own insulin. If you have Type 2 diabetes, the amount of time you will be on this medication will be determined by your doctor based on blood sugar and A1c control. You will most likely  be on this medication or another diabetes medication throughout your lifetime because your body does not make enough insulin. Do not abruptly stop this medication without talking to your doctor first.     How do I take this medication?  Take as directed on your prescription label. Tresiba is usually given once daily and can be taken with or without food. Tresiba is injected under the skin (subcutaneously) of your stomach, thigh, or upper arm. Use a different injection site in the same body region each day.       What are some possible side effects?  Tresiba may cause low blood sugar (hypoglycemia). Signs and symptoms that may indicate hypoglycemia include dizziness, sweating, anxiety, irritability, confusion, or headache. The most common side effects of Tresiba include reactions or skin thickening at the injection site, headache, weight gain, swelling of your hands and feet, and cough/runny nose/sore throat.    What happens if I miss a dose?  If you miss a dose, take it as soon as your remember. If it is close to your next dose, skip it. Always make sure there are at least 8 hours between doses and never take 2 doses at once.       Medication Safety    What are things I should warn my doctor immediately about?  Talk to your doctor if you are pregnant, planning to become pregnant, or breastfeeding. Also tell your doctor if you notice any signs/symptoms of an allergic reaction (rash, hives, difficulty breathing, etc.).    What are things that I should be cautious of?  Be cautious of any side effects from this medication. Talk to your doctor if any new ones develop or aren't getting better.    What are some medications that can interact with this one?  Do not take this medication with rosiglitazone. Taking Tresiba with other medications that lower your blood sugar may increase the risk of hypoglycemia. Your doctor may reduce the dose of these medications when you start Tresiba. Always tell your doctor or pharmacist  immediately if you start taking any new medications, including over-the-counter medications, vitamins, and herbal supplements.        Medication Storage/Handling    How should I handle this medication?  Keep this medication out of reach of pets/children and keep the pen capped when not in use. Do not share your medicine with others.     How does this medication need to be stored?  Store your new, unused pens in the original carton in the refrigerator. Protect it from light. Do not freeze. You may store your opened Tresiba at room temperature or in the refrigerator for up to 56 days (8 weeks). Always remove the needle from the pen before storing.     How should I dispose of this medication?  Used Tresiba pens should be thrown away after 56 days even if insulin remains.?Place your used pen and needle in an approved sharps container. ?If your doctor decides to stop this medication, take to your local police station for proper disposal. Some pharmacies also have?take-back bins for medication drop-off.??       Resources/Support    How can I remind myself to take this medication?  You can download reminder apps to help you manage your refills. You may also set an alarm on your phone to remind you.     Is financial support available?   [employer] can provide co-pay cards if you have commercial insurance or patient assistance if you have Medicare or no insurance.     Which vaccines are recommended for me?  Talk to your doctor about these vaccines: Flu, Coronavirus (COVID-19), Pneumococcal (pneumonia), Tdap, Hepatitis B, Zoster (shingles)             Adherence, Self-Administration, and Current Therapy Problems  Adherence related to the patient's specialty therapy was discussed with the patient. The Adherence segment of this outreach has been reviewed and updated.     Is there a concern with patient's ability to self administer the medication correctly and without issue?: No  Were any potential barriers to adherence  identified during the initial assessment or patient education?: No  Are there any concerns regarding the patient's understanding of the importance of medication adherence?: No    Adherence Questions  Linked Medication(s) Assessed: Continuous Glucose Sensor, Insulin Degludec, Insulin Lispro  On average, how many doses/injections does the patient miss per month?: 0  What are the identified reasons for non-adherence or missed doses? : no problems identified  What is the estimated medication adherence level?: %  Based on the patient/caregiver response and refill history, does this patient require an MTP to track adherence improvements?: no    Additional Barriers to Patient Self-Administration: None identified or disclosed  Methods for Supporting Patient Self-Administration: Continue to follow with fills and clinical assessments    Open Medication Therapy Problems  No medication therapy recommendations to display    Adverse Drug Reactions  Medication tolerability: Tolerating with no to minimal ADRs  Medication plan: Continue therapy with normal follow-up  Plan for ADR Management: No ADRs reported    Goals of Therapy  Goals related to the patient's specialty therapy were discussed with the patient. The Patient Goals segment of this outreach has been reviewed and updated.   Goals Addressed Today    None       Quality of Life Assessment   Quality of Life related to the patient's enrollment in the patient management program and services provided was discussed with the patient. The QOL segment of this outreach has been reviewed and updated.    Quality of Life Improvement Scale: 6-A little better    Reassessment Plan & Follow-Up  1. Medication Therapy Changes:   Increase dose of Humalog to 12 units TID  Stop Lantus   Start Tresiba 24 units daily  2. Related Plans, Therapy Recommendations, or Therapy Problems to Be Addressed: No issues  3. Pharmacist to perform regular assessments no more than (6) months from the previous  assessment.  4. Care Coordinator to set up future refill outreaches, coordinate prescription delivery, and escalate clinical questions to pharmacist.    Attestation  Therapeutic appropriateness: Appropriate   I attest the patient was actively involved in and has agreed to the above plan of care. If the prescribed therapy is at any point deemed not appropriate based on the current or future assessments, a consultation will be initiated with the patient's specialty care provider to determine the best course of action. The revised plan of therapy will be documented along with any required assessments and/or additional patient education provided.     Dana Nayak, PharmD, MM  Clinical Specialty Pharmacist, Endocrinology  6/14/2024  14:28 EDT     Specialty Pharmacy Patient Management Program  Endocrinology Refill Outreach      Guilherme is a 44 y.o. male contacted today regarding refills of his medication(s).    Specialty medication(s) and dose(s) confirmed: Humalog and Tresiba    Refill Questions      Flowsheet Row Most Recent Value   Changes to allergies? No   Changes to medications? Yes  [Increase dose of Humalog to 12 units TID, Stop Lantus and start Tresiba 24 units daily]   New conditions or infections since last clinic visit No   Unplanned office visit, urgent care, ED, or hospital admission in the last 4 weeks  No   How does patient/caregiver feel medication is working? Good   Financial problems or insurance changes  No   Since the previous refill, were any specialty medication doses or scheduled injections missed or delayed?  No   Does this patient require a clinical escalation to a pharmacist? No          Delivery Questions      Flowsheet Row Most Recent Value   Delivery method Beeline   Delivery address verified with patient/caregiver? Yes   Delivery address Home   Number of medications in delivery 2   Medication(s) being filled and delivered Insulin Lispro, Insulin Degludec   Doses left of specialty medications  New start Tresiba, 1 pen of Humalog   Copay verified? Yes   Copay amount $0   Copay form of payment No copayment ($0)            Follow-Up: Based on day supply for future fills    Dana Nayak, PharmD, MM   Clinical Speciality Pharmacist, Endocrinology   6/14/2024  14:33 EDT

## 2024-06-14 NOTE — PROGRESS NOTES
This provider is located at [place], [place]. The Patient is seen remotely using Video. Patient is being seen via telehealth and confirm that they are in a secure environment for this session. Patient is located in [place], [place] at his home. The patient's condition being diagnosed/treated is appropriate for telemedicine. Provider identified as Karime Boyd as well as credentials APRN MSN PMHNP-BC.   The client/patient gave consent to be seen remotely, and when consent is given they understand that the consent allows for patient identifiable information to be sent to a third party as needed.  They may refuse to be seen remotely at any time. The electronic data is encrypted and password protected, and the patient has been advised of the potential risks to privacy not withstanding such measures.    Chief Complaint  Anxiety and Sleeping Problem    Subjective        Guilherme Culpter presents to [facility] for   History of Present Illness  Patient is seen today for follow-up visit for anxiety and insomnia.  Patient reports that he is doing much better since starting the medications.  He states his worry and overthinking is much less.  States he has had much less feeling tense when driving in a car.  States he has not had any more panic attacks.  States that he has not really had to take the propranolol after the first few weeks.  He states that he feels he has some more improvement to make, but is pleased with the progress thus far.  He denies any depression.  Denies any hopelessness.  Denies any suicidal or homicidal ideation.  States his sleep is good with the help of Remeron.  States it did make him a little groggy, but it worked well.  He states that he is now walking again.  States he hopes to start work back in July.  Patient asked if he could be seen in 3 months time due to starting work back.  He denies any manic type symptoms.  Denies any paranoia.  Denies any auditory  or visual hallucinations.  Denies any side effects to the medications.  Objective   Vital Signs:   There were no vitals taken for this visit.      Mental Status Exam:   Hygiene:   good  Cooperation:  Cooperative  Eye Contact:  Good  Psychomotor Behavior:  Appropriate  Affect:  Full range  Mood: anxious  Speech:  Normal  Thought Process:  Goal directed  Thought Content:  Normal  Suicidal:  None  Homicidal:  None  Hallucinations:  None  Delusion:  None  Memory:  Intact  Orientation:  Person, Place, Time, and Situation  Reliability:  good  Insight:  Good  Judgement:  Good  Impulse Control:  Good  Physical/Medical Issues:  No      PHQ-9 Depression Screening  Little interest or pleasure in doing things? (P) 1-->several days   Feeling down, depressed, or hopeless? (P) 1-->several days   Trouble falling or staying asleep, or sleeping too much? (P) 1-->several days   Feeling tired or having little energy? (P) 1-->several days   Poor appetite or overeating? (P) 1-->several days   Feeling bad about yourself - or that you are a failure or have let yourself or your family down? (P) 0-->not at all   Trouble concentrating on things, such as reading the newspaper or watching television? (P) 0-->not at all   Moving or speaking so slowly that other people could have noticed? Or the opposite - being so fidgety or restless that you have been moving around a lot more than usual? (P) 0-->not at all   Thoughts that you would be better off dead, or of hurting yourself in some way? (P) 0-->not at all   PHQ-9 Total Score (P) 5   If you checked off any problems, how difficult have these problems made it for you to do your work, take care of things at home, or get along with other people? (P) not difficult at all        PHQ-9 Total Score: (P) 5     RHONDA 7 anxiety screening tool that patient filled out virtually reviewed by this APRN at today's encounter.    Previous Provider notes and available records reviewed by this APRN today.   Current  Medications:   Current Outpatient Medications   Medication Sig Dispense Refill    Blood Glucose Monitoring Suppl (OneTouch Verio Reflect) w/Device kit Use 1 each Daily. 1 kit 0    Continuous Glucose  (FreeStyle Mike 3 [place]) device Use 1 each Take As Directed. 1 each 0    Continuous Glucose Sensor (FreeStyle Mike 3 Sensor) misc Use 1 each Every 14 (Fourteen) Days. 2 each 3    docusate sodium (COLACE) 50 MG capsule Take 1 capsule by mouth 2 (Two) Times a Day.      gabapentin (NEURONTIN) 300 MG capsule Take 1 capsule by mouth 3 (Three) Times a Day. 90 capsule 3    glucose blood test strip Use 1 test strip 4 (Four) Times a Day. One touch verio 100 each 3    Insulin Glargine (LANTUS SOLOSTAR) 100 UNIT/ML injection pen Inject 6 Units under the skin into the appropriate area as directed Daily 15 mL 0    Insulin Lispro, 1 Unit Dial, (HumaLOG KwikPen) 100 UNIT/ML solution pen-injector Inject 3 Units under the skin into the appropriate area as directed 3 times a day. 15 mL 1    Insulin Pen Needle (Pen Needles) 31G X 5 MM misc Use 1 each 4 (Four) Times a Day. 200 each 2    Lancets misc Use 1 lancet 4 (Four) Times a Day. 100 each 3    METHADONE HCL PO Take 1 tablet by mouth Daily. FOLLOW INSTRUCTIONS PER PRESCRIBER      mirtazapine (Remeron) 15 MG tablet Take 1 tablet by mouth At Night As Needed (sleep). 90 tablet 0    pantoprazole (PROTONIX) 40 MG EC tablet Take 1 tablet by mouth Daily. 90 tablet 3    polyethylene glycol (MIRALAX) 17 g packet Take 17 g by mouth Daily.      propranolol (INDERAL) 10 MG tablet Take 1 tablet by mouth 2 (Two) Times a Day As Needed (anxiety). 60 tablet 0    sertraline (Zoloft) 100 MG tablet Take 1 tablet by mouth Daily. 90 tablet 0     No current facility-administered medications for this visit.       Physical Exam   Result Review :    The following data was reviewed by: TODD Pimentel on 06/12/2024:  Common labs          2/12/2024    08:25 2/26/2024    12:18 2/26/2024    14:44  "5/2/2024    12:34   Common Labs   Glucose 179   134  137    BUN 9   11  18    Creatinine 0.97   0.95  0.84    Sodium 138   141  139    Potassium 4.4   4.0  5.0    Chloride 97   105  100    Calcium 9.8   9.6  9.9    Total Protein 7.4       Albumin 4.5   4.7  4.1    Total Bilirubin 0.3   0.3  <0.2    Alkaline Phosphatase 59   54  61    AST (SGOT) 37   24  21    ALT (SGPT) 64   49  31    WBC 5.3  6.28      Hemoglobin 15.8  14.3      Hematocrit 47.0  41.9      Platelets 239  207      Total Cholesterol 209       Triglycerides 281       HDL Cholesterol 34       LDL Cholesterol  125       Hemoglobin A1C 12.7       Microalbumin, Urine <3.0       Uric Acid 5.5            Assessment and Plan   Problem List Items Addressed This Visit          Mental Health    Post traumatic stress disorder (PTSD)    Relevant Medications    sertraline (Zoloft) 100 MG tablet    mirtazapine (Remeron) 15 MG tablet    Panic disorder without agoraphobia - Primary    Relevant Medications    sertraline (Zoloft) 100 MG tablet    mirtazapine (Remeron) 15 MG tablet    Insomnia due to mental condition    Relevant Medications    sertraline (Zoloft) 100 MG tablet    mirtazapine (Remeron) 15 MG tablet     Discussed treatment options with patient.  Patient states he feels he is \"close\" to where he wants to be, but ask about the increase in the Zoloft.  Discussed with patient that we can increase the Zoloft to 100 mg daily for anxiety.  Also encouraged patient to take 2 of the Remeron 15 mg at bedtime for sleep as that will cause less grogginess with the increased dosage.  Patient verbalized understanding and agreed.  At patient's request, we will see patient again in 3 months to reassess.  Encouraged patient to contact the office if he has any issues sooner.    TREATMENT PLAN/GOALS: Continue supportive psychotherapy efforts and medications as indicated. Treatment and medication options discussed during today's visit. Patient ackowledged and verbally consented " to continue with current treatment plan and was educated on the importance of compliance with treatment and follow-up appointments.    DEPRESSION:  Patient screened positive for depression based on a PHQ-9 score of 5 on 6/5/2024. Follow-up recommendations include: Suicide Risk Assessment performed.       MEDICATION ISSUES:  We discussed risks, benefits, and side effects of the above medications and the patient was agreeable with the plan. Patient was educated on the importance of compliance with treatment and follow-up appointments.  Patient is agreeable to call the office with any worsening of symptoms or onset of side effects. Patient is agreeable to call 911 or go to the nearest ER should he/she begin having SI/HI.      Counseled patient regarding multimodal approach with healthy nutrition, healthy sleep, regular physical activity, social activities, counseling, and medications.      Coping skills reviewed and encouraged positive framing of thoughts     Assisted patient in processing above session content; acknowledged and normalized patient’s thoughts, feelings, and concerns.  Applied  positive coping skills and behavior management in session.  Allowed patient to freely discuss issues without interruption or judgment. Provided safe, confidential environment to facilitate the development of positive therapeutic relationship and encourage open, honest communication. Assisted patient in identifying risk factors which would indicate the need for higher level of care including thoughts to harm self or others and/or self-harming behavior and encouraged patient to contact this office, call 911, or present to the nearest emergency room should any of these events occur. Discussed crisis intervention services and means to access. If patient has any concerns or needs assistance they were instructed to call the [facility] at [phone].    MEDS ORDERED DURING VISIT:  New Medications Ordered This  Visit   Medications    sertraline (Zoloft) 100 MG tablet     Sig: Take 1 tablet by mouth Daily.     Dispense:  90 tablet     Refill:  0    mirtazapine (Remeron) 15 MG tablet     Sig: Take 1 tablet by mouth At Night As Needed (sleep).     Dispense:  90 tablet     Refill:  0         Follow Up   Return in about 3 months (around 9/12/2024) for Video visit.    Patient was given instructions and counseling regarding his condition or for health maintenance advice. Please see specific information pulled into the AVS if appropriate.         This document has been electronically signed by TODD Pimentel  June 13, 2024 21:36 EDT    Part of this note may be an electronic transcription/translation of spoken language to printed text using the Dragon Dictation System.

## 2024-06-14 NOTE — TELEPHONE ENCOUNTER
Rx Refill Note  Requested Prescriptions     Pending Prescriptions Disp Refills    Insulin Lispro, 1 Unit Dial, (HumaLOG KwikPen) 100 UNIT/ML solution pen-injector 15 mL 1     Sig: Inject 3 Units under the skin into the appropriate area as directed 3 times a day.      Last office visit with prescribing clinician: 5/2/2024   Last telemedicine visit with prescribing clinician: Visit date not found   Next office visit with prescribing clinician: 8/2/2024                         Would you like a call back once the refill request has been completed: [] Yes [] No    If the office needs to give you a call back, can they leave a voicemail: [] Yes [] No    Nakia Toledo  06/14/24, 08:07 EDT

## 2024-06-21 ENCOUNTER — OFFICE VISIT (OUTPATIENT)
Dept: SLEEP MEDICINE | Facility: HOSPITAL | Age: 44
End: 2024-06-21
Payer: COMMERCIAL

## 2024-06-21 VITALS
BODY MASS INDEX: 29.24 KG/M2 | HEART RATE: 79 BPM | OXYGEN SATURATION: 98 % | WEIGHT: 197.4 LBS | DIASTOLIC BLOOD PRESSURE: 80 MMHG | HEIGHT: 69 IN | SYSTOLIC BLOOD PRESSURE: 123 MMHG

## 2024-06-21 DIAGNOSIS — G47.34 NOCTURNAL HYPOXIA: ICD-10-CM

## 2024-06-21 DIAGNOSIS — G47.33 OSA (OBSTRUCTIVE SLEEP APNEA): Primary | ICD-10-CM

## 2024-06-21 DIAGNOSIS — R91.1 LUNG NODULE SEEN ON IMAGING STUDY: ICD-10-CM

## 2024-06-21 DIAGNOSIS — E66.3 OVERWEIGHT (BMI 25.0-29.9): ICD-10-CM

## 2024-06-21 PROCEDURE — G0463 HOSPITAL OUTPT CLINIC VISIT: HCPCS

## 2024-06-21 NOTE — PROGRESS NOTES
[facility]  Telephone: [phone] / Fax: [phone] [place]  Telephone: [phone] / Fax: [phone] Stefanie Estrada    PCP: Michelle Cabral PA    Reason for visit: discuss sleep study results.    Guilherme Garcia is a 44 y.o.male  was last seen at [facility] sleep lab on [address].  He had HST on June 12. He is here today to review sleep study results and discuss treatment. Study showed very severe JACK with AHI 78/hr, lowest O2 desaturation 71%. He spent 63 minutes with O2 sats <89%. His sleep schedule is 9pm-5am. ESS is 7.    SH- no tobacco, no alcohol, 3 caffeine per day.    ROS- negative.    Current Medications:    Current Outpatient Medications:     Blood Glucose Monitoring Suppl (OneTouch Verio Reflect) w/Device kit, Use 1 each Daily., Disp: 1 kit, Rfl: 0    Continuous Glucose  (FreeStyle Mike 3 [place]) device, Use 1 each Take As Directed., Disp: 1 each, Rfl: 0    Continuous Glucose Sensor (FreeStyle Mike 3 Sensor) misc, Use 1 each Every 14 (Fourteen) Days., Disp: 2 each, Rfl: 3    docusate sodium (COLACE) 50 MG capsule, Take 1 capsule by mouth 2 (Two) Times a Day., Disp: , Rfl:     gabapentin (NEURONTIN) 300 MG capsule, Take 1 capsule by mouth 3 (Three) Times a Day., Disp: 90 capsule, Rfl: 3    glucose blood test strip, Use 1 test strip 4 (Four) Times a Day. One touch verio, Disp: 100 each, Rfl: 3    insulin degludec (Tresiba FlexTouch) 100 UNIT/ML solution pen-injector injection, Inject 24 Units under the skin into the appropriate area as directed Daily., Disp: 30 mL, Rfl: 0    Insulin Lispro, 1 Unit Dial, (HumaLOG KwikPen) 100 UNIT/ML solution pen-injector, Inject 12 Units under the skin into the appropriate area as directed 3 (Three) Times a Day With Meals., Disp: 45 mL, Rfl: 0    Insulin Pen Needle (Pen Needles) 31G X 5 MM misc, Use 1 each 4 (Four) Times a Day., Disp: 200 each, Rfl: 2    Lancets misc, Use 1 lancet 4 (Four) Times a Day., Disp: 100 each, Rfl: 3    METHADONE  "HCL PO, Take 1 tablet by mouth Daily. FOLLOW INSTRUCTIONS PER PRESCRIBER, Disp: , Rfl:     mirtazapine (Remeron) 15 MG tablet, Take 1 tablet by mouth At Night As Needed (sleep)., Disp: 90 tablet, Rfl: 0    pantoprazole (PROTONIX) 40 MG EC tablet, Take 1 tablet by mouth Daily., Disp: 90 tablet, Rfl: 3    polyethylene glycol (MIRALAX) 17 g packet, Take 17 g by mouth Daily., Disp: , Rfl:     propranolol (INDERAL) 10 MG tablet, Take 1 tablet by mouth 2 (Two) Times a Day As Needed (anxiety)., Disp: 60 tablet, Rfl: 0    sertraline (Zoloft) 100 MG tablet, Take 1 tablet by mouth Daily., Disp: 90 tablet, Rfl: 0   also entered in Sleep Questionnaire    Patient  has a past medical history of Bleeding in mouth, Diabetes mellitus, Dry throat, GERD (gastroesophageal reflux disease), Hepatitis B (2014), and History of kidney stones.    I have reviewed the Past Medical History, Past Surgical History, Social History and Family History.    Vital Signs /80   Pulse 79   Ht 175.3 cm (69.02\")   Wt 89.5 kg (197 lb 6.4 oz)   SpO2 98%   BMI 29.13 kg/m²  Body mass index is 29.13 kg/m².    General Alert and oriented. No acute distress noted   Pharynx/Throat Class IV Mallampati airway, large tongue, no evidence of redundant lateral pharyngeal tissue. No oral lesions. No thrush. Moist mucous membranes.   Head Normocephalic. Symmetrical. Atraumatic.    Nose No septal deviation. No drainage   Chest Wall Normal shape. Symmetric expansion with respiration. No tenderness.   Neck Trachea midline, no thyromegaly or adenopathy    Lungs Clear to auscultation bilaterally. No wheezes. No rhonchi. No rales. Respirations regular, even and unlabored.   Heart Regular rhythm and normal rate. Normal S1 and S2. No murmur   Abdomen Soft, non-tender and non-distended. Normal bowel sounds. No masses.   Extremities Moves all extremities well. No edema   Psychiatric Normal mood and affect.     Testing:  Study-6/12/24-Diagnostic findings: The patient " tolerated the home sleep testing with monitoring time of 400 minutes. The data obtained make this a technically adequate study. The apnea hypopneas index(AHI) was 78.6 per sleep hour.  The AHI during supine position was 78.6 per sleep hour. Mean heart rate of 74.2 BPM.  Snoring was noted 18.9% of sleep time. Lowest oxygen saturation during the study was 71%. Saturation below 89% was noted for 63.2 mins.     Impression:  1. JACK (obstructive sleep apnea)    2. Nocturnal hypoxia    3. Overweight (BMI 25.0-29.9)    4. Lung nodule seen on imaging study        Plan  I reviewed sleep study results with patient. We discussed the adverse outcomes associated with untreated sleep disorder breathing. I recommended tx with CPAP device.     I placed an URGENT order for auto CPAP 5-15cm H2O to DME.  We will do an ovn oximtry on CPAP after Guilherme gets adjusted to it to ensure desaturations are corrected. I explained to him how CPAP works and how inspire work to correct JACK. In view of JACK severity, CPAP is preferred.     I reviewed recent CT chest. He has a new finding of lung nodule of CT chest. Done as part of possible hemoptysis eval that currently resolved. Former smoker, quit 10 years ago, smoked 1ppd for 10 years. Referred to pulmonary by PCP.  Scheduled to see Dr Dubose 8/2/24 with PFT.    F/u here in 3 months to review response to treatment with CPAP.    I appreciate the opportunity to participate in this patient's care.      TODD Evans  [facility]  Phone: [phone]

## 2024-06-24 ENCOUNTER — TELEPHONE (OUTPATIENT)
Dept: SLEEP MEDICINE | Facility: HOSPITAL | Age: 44
End: 2024-06-24
Payer: COMMERCIAL

## 2024-06-25 ENCOUNTER — TELEPHONE (OUTPATIENT)
Dept: ENDOCRINOLOGY | Age: 44
End: 2024-06-25
Payer: COMMERCIAL

## 2024-06-25 NOTE — TELEPHONE ENCOUNTER
Sent a message to  radiologist  to request an addendum to the CT adrenal gland mentioning Hounsfield units.  This is a second time contacting radiologist.

## 2024-07-01 ENCOUNTER — SPECIALTY PHARMACY (OUTPATIENT)
Dept: ENDOCRINOLOGY | Age: 44
End: 2024-07-01
Payer: COMMERCIAL

## 2024-07-10 DIAGNOSIS — R20.2 RIGHT LEG PARESTHESIAS: ICD-10-CM

## 2024-07-10 DIAGNOSIS — R20.2 BILATERAL LEG PARESTHESIA: ICD-10-CM

## 2024-07-12 RX ORDER — GABAPENTIN 300 MG/1
300 CAPSULE ORAL 3 TIMES DAILY
Qty: 90 CAPSULE | Refills: 3 | Status: SHIPPED | OUTPATIENT
Start: 2024-07-12 | End: 2024-07-12

## 2024-07-12 RX ORDER — GABAPENTIN 300 MG/1
600 CAPSULE ORAL 3 TIMES DAILY
Qty: 180 CAPSULE | Refills: 4 | Status: SHIPPED | OUTPATIENT
Start: 2024-07-12

## 2024-07-12 NOTE — TELEPHONE ENCOUNTER
Specialty Pharmacy Patient Management Program  Prescription Refill Request     Patient currently fills medications at  Pharmacy. Needing refill(s) on the following:      Requested Prescriptions     Pending Prescriptions Disp Refills    gabapentin (NEURONTIN) 300 MG capsule 180 capsule 4     Sig: Take 2 capsules by mouth 3 (Three) Times a Day.       Pended for Aaron Serna MD to review, and approve if appropriate.

## 2024-07-25 ENCOUNTER — SPECIALTY PHARMACY (OUTPATIENT)
Dept: ENDOCRINOLOGY | Age: 44
End: 2024-07-25
Payer: COMMERCIAL

## 2024-07-25 NOTE — PROGRESS NOTES
Specialty Pharmacy Refill Coordination Note     Guilherme is a 44 y.o. male contacted today regarding refills of  1 specialty medication(s).    Reviewed and verified with patient:       Specialty medication(s) and dose(s) confirmed: yes    Refill Questions      Flowsheet Row Most Recent Value   Changes to allergies? No   Changes to medications? No   New conditions or infections since last clinic visit No   Unplanned office visit, urgent care, ED, or hospital admission in the last 4 weeks  No   How does patient/caregiver feel medication is working? Good   Financial problems or insurance changes  No   Since the previous refill, were any specialty medication doses or scheduled injections missed or delayed?  No   Does this patient require a clinical escalation to a pharmacist? No            Delivery Questions      Flowsheet Row Most Recent Value   Delivery method Beeline   Delivery address verified with patient/caregiver? Yes   Delivery address Home   Number of medications in delivery 1   Medication(s) being filled and delivered Continuous Glucose Sensor   Doses left of specialty medications 1   Copay verified? Yes   Copay amount 0   Copay form of payment No copayment ($0)   Ship Date 07/25   Delivery Date 07/26   Signature Required Yes                   Follow-up: 25 day(s)     Lanny Wilkins, Pharmacy Technician  Specialty Pharmacy Technician

## 2024-08-02 ENCOUNTER — SPECIALTY PHARMACY (OUTPATIENT)
Dept: ENDOCRINOLOGY | Age: 44
End: 2024-08-02
Payer: COMMERCIAL

## 2024-08-02 ENCOUNTER — OFFICE VISIT (OUTPATIENT)
Dept: ENDOCRINOLOGY | Age: 44
End: 2024-08-02
Payer: COMMERCIAL

## 2024-08-02 ENCOUNTER — LAB (OUTPATIENT)
Facility: HOSPITAL | Age: 44
End: 2024-08-02
Payer: COMMERCIAL

## 2024-08-02 ENCOUNTER — TELEPHONE (OUTPATIENT)
Dept: ENDOCRINOLOGY | Age: 44
End: 2024-08-02

## 2024-08-02 VITALS
OXYGEN SATURATION: 98 % | TEMPERATURE: 96.9 F | DIASTOLIC BLOOD PRESSURE: 86 MMHG | HEART RATE: 93 BPM | SYSTOLIC BLOOD PRESSURE: 130 MMHG | WEIGHT: 207.4 LBS | BODY MASS INDEX: 30.72 KG/M2 | HEIGHT: 69 IN

## 2024-08-02 DIAGNOSIS — E11.65 TYPE 2 DIABETES MELLITUS WITH HYPERGLYCEMIA, WITH LONG-TERM CURRENT USE OF INSULIN: Primary | ICD-10-CM

## 2024-08-02 DIAGNOSIS — Z79.4 TYPE 2 DIABETES MELLITUS WITH HYPERGLYCEMIA, WITH LONG-TERM CURRENT USE OF INSULIN: Primary | ICD-10-CM

## 2024-08-02 DIAGNOSIS — E27.8 LEFT ADRENAL MASS: ICD-10-CM

## 2024-08-02 LAB
ALBUMIN SERPL-MCNC: 4.2 G/DL (ref 3.5–5.2)
ALBUMIN UR-MCNC: <1.2 MG/DL
ALBUMIN/GLOB SERPL: 1.1 G/DL
ALP SERPL-CCNC: 73 U/L (ref 39–117)
ALT SERPL W P-5'-P-CCNC: 22 U/L (ref 1–41)
ANION GAP SERPL CALCULATED.3IONS-SCNC: 14.3 MMOL/L (ref 5–15)
AST SERPL-CCNC: 19 U/L (ref 1–40)
BILIRUB SERPL-MCNC: 0.4 MG/DL (ref 0–1.2)
BUN SERPL-MCNC: 16 MG/DL (ref 6–20)
BUN/CREAT SERPL: 16.5 (ref 7–25)
CALCIUM SPEC-SCNC: 10 MG/DL (ref 8.6–10.5)
CHLORIDE SERPL-SCNC: 100 MMOL/L (ref 98–107)
CHOLEST SERPL-MCNC: 205 MG/DL (ref 0–200)
CO2 SERPL-SCNC: 22.7 MMOL/L (ref 22–29)
CREAT SERPL-MCNC: 0.97 MG/DL (ref 0.76–1.27)
CREAT UR-MCNC: 101.5 MG/DL
EGFRCR SERPLBLD CKD-EPI 2021: 97.5 ML/MIN/1.73
GLOBULIN UR ELPH-MCNC: 3.7 GM/DL
GLUCOSE SERPL-MCNC: 208 MG/DL (ref 65–99)
HBA1C MFR BLD: 7.8 % (ref 4.8–5.6)
HDLC SERPL-MCNC: 26 MG/DL (ref 40–60)
LDLC SERPL CALC-MCNC: 122 MG/DL (ref 0–100)
LDLC/HDLC SERPL: 4.39 {RATIO}
MICROALBUMIN/CREAT UR: NORMAL MG/G{CREAT}
POTASSIUM SERPL-SCNC: 4.8 MMOL/L (ref 3.5–5.2)
PROT SERPL-MCNC: 7.9 G/DL (ref 6–8.5)
SODIUM SERPL-SCNC: 137 MMOL/L (ref 136–145)
TRIGL SERPL-MCNC: 324 MG/DL (ref 0–150)
VLDLC SERPL-MCNC: 57 MG/DL (ref 5–40)

## 2024-08-02 PROCEDURE — 99214 OFFICE O/P EST MOD 30 MIN: CPT | Performed by: STUDENT IN AN ORGANIZED HEALTH CARE EDUCATION/TRAINING PROGRAM

## 2024-08-02 PROCEDURE — 83036 HEMOGLOBIN GLYCOSYLATED A1C: CPT | Performed by: STUDENT IN AN ORGANIZED HEALTH CARE EDUCATION/TRAINING PROGRAM

## 2024-08-02 PROCEDURE — 80061 LIPID PANEL: CPT | Performed by: STUDENT IN AN ORGANIZED HEALTH CARE EDUCATION/TRAINING PROGRAM

## 2024-08-02 PROCEDURE — 82043 UR ALBUMIN QUANTITATIVE: CPT | Performed by: STUDENT IN AN ORGANIZED HEALTH CARE EDUCATION/TRAINING PROGRAM

## 2024-08-02 PROCEDURE — 80053 COMPREHEN METABOLIC PANEL: CPT | Performed by: STUDENT IN AN ORGANIZED HEALTH CARE EDUCATION/TRAINING PROGRAM

## 2024-08-02 PROCEDURE — 82570 ASSAY OF URINE CREATININE: CPT | Performed by: STUDENT IN AN ORGANIZED HEALTH CARE EDUCATION/TRAINING PROGRAM

## 2024-08-02 PROCEDURE — 95251 CONT GLUC MNTR ANALYSIS I&R: CPT | Performed by: STUDENT IN AN ORGANIZED HEALTH CARE EDUCATION/TRAINING PROGRAM

## 2024-08-02 PROCEDURE — 36415 COLL VENOUS BLD VENIPUNCTURE: CPT | Performed by: STUDENT IN AN ORGANIZED HEALTH CARE EDUCATION/TRAINING PROGRAM

## 2024-08-02 PROCEDURE — 3046F HEMOGLOBIN A1C LEVEL >9.0%: CPT | Performed by: STUDENT IN AN ORGANIZED HEALTH CARE EDUCATION/TRAINING PROGRAM

## 2024-08-02 RX ORDER — SEMAGLUTIDE 0.68 MG/ML
0.5 INJECTION, SOLUTION SUBCUTANEOUS WEEKLY
Qty: 3 ML | Refills: 0 | Status: SHIPPED | OUTPATIENT
Start: 2024-08-02

## 2024-08-02 NOTE — ASSESSMENT & PLAN NOTE
Diabetes is  Uncontrolled .   Medication changes per orders.  Reminded to bring in blood sugar diary at next visit.  Recommended an ADA diet.  Recommended a Mediterranean style of eating  Regular aerobic exercise.  Discussed ways to avoid symptomatic hypoglycemia.  Discussed sick day management.  Reminded to get yearly retinal exam.  Will increase the dose of Tresiba to 30 units daily  If fasting blood sugar less than 90 on 2 consecutive days can decrease the dose of Tresiba by 2 units  Take Humalog 15 units with meals  Will start Ozempic  Side effects and benefit discussed in detail  Will start Ozempic 0.25 mg weekly if able to tolerate can increase the dose to 0.5 mg in 4 weeks  Ozempic sample provided  Repeat labs today  Diabetes will be reassessed  4 months

## 2024-08-02 NOTE — TELEPHONE ENCOUNTER
Specialty Pharmacy Patient Management Program  Prescription Refill Request     Patient currently fills medications at  Pharmacy. Needing refill(s) on the following:      Requested Prescriptions     Pending Prescriptions Disp Refills    Semaglutide,0.25 or 0.5MG/DOS, (Ozempic, 0.25 or 0.5 MG/DOSE,) 2 MG/3ML solution pen-injector 3 mL 0     Sig: Inject 0.5 mg under the skin into the appropriate area as directed 1 (One) Time Per Week.       Pended for Dr. Nokhbehzaeim to review, and approve if appropriate.     Dana Nayak, PharmD, MM   Clinical Speciality Pharmacist, Endocrinology   8/2/2024  15:52 EDT

## 2024-08-02 NOTE — PROGRESS NOTES
Chief Complaint  Diabetes (Mike attached , need refill on lispro)    Subjective        Guilherme RUIZ Jose presents to [facility]  for follow up.     History of Present Illness      Diagnosed with type 2 diabetes in 2018   Metformin dc'd due to GI side effects     Current medications :  Tresiba  24 units daily  Humalog 15 units with meals   Hemoglobin A1c  6.9%  4/12/2024     Freestyle downloaded and reviewed  Target range 33%  High 52%  Very high 15%  Average glucose 204  Glucose variability 22.3%  GMI 8.2%  Blood sugar tracing above target range  Blood sugar less than 70     Takes Neurontin for pain  ( no diabetic neuropathy)     Adrenal mass  CT adrenal: unenhanced 0.3 hounsfield units. Attenuation less than 10 on unenhanced is good. Probably an adrenal adenoma.   Hormonal workup unremarkable        Component      Latest Ref Rng 2/26/2024 5/2/2024 5/3/2024   Glucose      65 - 99 mg/dL  137 (H)     BUN      6 - 20 mg/dL  18     Creatinine      0.76 - 1.27 mg/dL  0.84     Sodium      136 - 145 mmol/L  139     Potassium      3.5 - 5.2 mmol/L  5.0     Chloride      98 - 107 mmol/L  100     CO2      22.0 - 29.0 mmol/L  26.8     Calcium      8.6 - 10.5 mg/dL  9.9     Total Protein      6.0 - 8.5 g/dL  7.8     Albumin      3.5 - 5.2 g/dL  4.1     ALT (SGPT)      1 - 41 U/L  31     AST (SGOT)      1 - 40 U/L  21     Alkaline Phosphatase      39 - 117 U/L  61     Total Bilirubin      0.0 - 1.2 mg/dL  <0.2     Globulin      gm/dL  3.7     A/G Ratio      g/dL  1.1     BUN/Creatinine Ratio      7.0 - 25.0   21.4     Anion Gap      5.0 - 15.0 mmol/L  12.2     eGFR      >60.0 mL/min/1.73  108.9     Aldosterone      0.0 - 30.0 ng/dL  16.4     Renin Activity      0.167 - 5.380 ng/mL/hr  5.375     Aldosterone/Renin Ratio      0.0 - 30.0   3.1     Normetanephrine      0.0 - 218.9 pg/mL  37.1     Metanephrine      0.0 - 88.0 pg/mL  <25.0     Magnesium      1.6 - 2.6 mg/dL 2.2      RHONDA-65      0.0 - 5.0  "U/mL  <5.0     C-Peptide      1.1 - 4.4 ng/mL  5.8 (H)     ZNT8 Antibodies      U/mL  <15     Islet Cell Ab      Neg:<1:1   Negative     Dexamethasone, Serum      ng/dL   319    Cortisol      6.2 - 19.4 ug/dL   0.7 (L)       Legend:  (H) High  (L) Low    Objective   Vital Signs:  /86   Pulse 93   Temp 96.9 °F (36.1 °C) (Temporal)   Ht 175.3 cm (69.02\")   Wt 94.1 kg (207 lb 6.4 oz)   SpO2 98%   BMI 30.61 kg/m²   Estimated body mass index is 30.61 kg/m² as calculated from the following:    Height as of this encounter: 175.3 cm (69.02\").    Weight as of this encounter: 94.1 kg (207 lb 6.4 oz).                 Physical Exam  Constitutional:       Appearance: He is obese.   HENT:      Head: Normocephalic and atraumatic.   Eyes:      Extraocular Movements: Extraocular movements intact.   Cardiovascular:      Rate and Rhythm: Normal rate and regular rhythm.   Pulmonary:      Effort: Pulmonary effort is normal.      Breath sounds: Normal breath sounds. No wheezing.   Abdominal:      Palpations: Abdomen is soft.      Tenderness: There is no abdominal tenderness.   Musculoskeletal:         General: No swelling. Normal range of motion.      Cervical back: Neck supple. No tenderness.   Neurological:      Mental Status: He is alert and oriented to person, place, and time.   Psychiatric:         Mood and Affect: Mood normal.      Result Review :  The following data was reviewed by: Mahrokh Nokhbehzaeim, MD on 08/02/2024:  CMP          2/12/2024    08:25 2/26/2024    14:44 5/2/2024    12:34   CMP   Glucose 179  134  137    BUN 9  11  18    Creatinine 0.97  0.95  0.84    EGFR  100.0  108.9    Sodium 138  141  139    Potassium 4.4  4.0  5.0    Chloride 97  105  100    Calcium 9.8  9.6  9.9    Total Protein 7.4      Total Protein  7.6  7.8    Albumin 4.5  4.7  4.1    Globulin 2.9      Globulin  2.9  3.7    Total Bilirubin 0.3  0.3  <0.2    Alkaline Phosphatase 59  54  61    AST (SGOT) 37  24  21    ALT (SGPT) 64  49  31  " "  Albumin/Globulin Ratio  1.6  1.1    BUN/Creatinine Ratio 9  11.6  21.4    Anion Gap  9.0  12.2      CMP          2/12/2024    08:25 2/26/2024    14:44 5/2/2024    12:34   CMP   Glucose 179  134  137    BUN 9  11  18    Creatinine 0.97  0.95  0.84    EGFR  100.0  108.9    Sodium 138  141  139    Potassium 4.4  4.0  5.0    Chloride 97  105  100    Calcium 9.8  9.6  9.9    Total Protein 7.4      Total Protein  7.6  7.8    Albumin 4.5  4.7  4.1    Globulin 2.9      Globulin  2.9  3.7    Total Bilirubin 0.3  0.3  <0.2    Alkaline Phosphatase 59  54  61    AST (SGOT) 37  24  21    ALT (SGPT) 64  49  31    Albumin/Globulin Ratio  1.6  1.1    BUN/Creatinine Ratio 9  11.6  21.4    Anion Gap  9.0  12.2       Lipid Panel          2/12/2024    08:25   Lipid Panel   Total Cholesterol 209    Triglycerides 281    HDL Cholesterol 34    VLDL Cholesterol 50    LDL Cholesterol  125    LDL/HDL Ratio 3.7       Most Recent A1C          2/12/2024    08:25   HGBA1C Most Recent   Hemoglobin A1C 12.7          A1C Last 3 Results          2/12/2024    08:25   HGBA1C Last 3 Results   Hemoglobin A1C 12.7       Microalbumin          2/12/2024    08:25   Microalbumin   Microalbumin, Urine <3.0       TSH          2/12/2024    08:25 2/26/2024    12:18   TSH   TSH 1.680  1.300       Free T4   Date Value Ref Range Status   02/26/2024 1.21 0.93 - 1.70 ng/dL Final      No results found for: \"ACTH\"   Cortisol   Date Value Ref Range Status   05/03/2024 0.7 (L) 6.2 - 19.4 ug/dL Final     Comment:     Please Note: The reference interval and flagging for   this test is for an AM collection. If this is a PM   collection please use:         Cortisol PM: 2.3-11.9        Aldosterone   Date Value Ref Range Status   05/02/2024 16.4 0.0 - 30.0 ng/dL Final     Renin Activity   Date Value Ref Range Status   05/02/2024 5.375 0.167 - 5.380 ng/mL/hr Final     Aldosterone/Renin Ratio   Date Value Ref Range Status   05/02/2024 3.1 0.0 - 30.0 Final     Comment:           "                    Units:      ng/dL per ng/mL/hr      Normetanephrine   Date Value Ref Range Status   2024 37.1 0.0 - 218.9 pg/mL Final     Metanephrine   Date Value Ref Range Status   2024 <25.0 0.0 - 88.0 pg/mL Final      Dexamethasone, Serum   Date Value Ref Range Status   2024 319 ng/dL Final     Comment:     This test was developed and its performance characteristics  determined by [facility]. It has not been cleared or approved  by the Food and Drug Administration.  Reference Range:  Adults baseline: <30  8:00 AM following 1 mg dexamethasone   previous evenin - 295  8:00 AM following 8 mg dexamethasone  (4 x 2 mg doses) previous day:  1600 - 2850        Data reviewed : Radiologic studies CT adrenal             Assessment and Plan   Diagnoses and all orders for this visit:    1. Type 2 diabetes mellitus with hyperglycemia, with long-term current use of insulin (Primary)  Assessment & Plan:  Diabetes is  Uncontrolled .   Medication changes per orders.  Reminded to bring in blood sugar diary at next visit.  Recommended an ADA diet.  Recommended a Mediterranean style of eating  Regular aerobic exercise.  Discussed ways to avoid symptomatic hypoglycemia.  Discussed sick day management.  Reminded to get yearly retinal exam.  Will increase the dose of Tresiba to 30 units daily  If fasting blood sugar less than 90 on 2 consecutive days can decrease the dose of Tresiba by 2 units  Take Humalog 15 units with meals  Will start Ozempic  Side effects and benefit discussed in detail  Will start Ozempic 0.25 mg weekly if able to tolerate can increase the dose to 0.5 mg in 4 weeks  Ozempic sample provided  Repeat labs today  Diabetes will be reassessed  4 months    Orders:  -     Hemoglobin A1c  -     Comprehensive Metabolic Panel  -     Microalbumin / Creatinine Urine Ratio - Urine, Clean Catch  -     Lipid Panel    2. Left adrenal mass  Assessment & Plan:  Hormonal workup unremarkable  CT adrenal  showed low Hounsfield adrenal mass  Will continue to monitor      Hormonal workup in May 2025         Follow Up   Return in about 4 months (around 12/2/2024).  Patient was given instructions and counseling regarding his condition or for health maintenance advice. Please see specific information pulled into the AVS if appropriate.

## 2024-08-02 NOTE — PROGRESS NOTES
Specialty Pharmacy Patient Management Program  Endocrinology Medication Addition Assessment     Guilherme Garcia was referred by an Endocrinology provider to the Endocrinology Patient Management Program offered by [facility] for Type 2 Diabetes. This assessment was conducted as a result of a specialty medication addition or substitution. The patient was previously introduced to services offered by [facility], including: regular assessments, refill coordination, curbside pick-up or mail order delivery options, prior authorization maintenance, and financial assistance programs as applicable. The patient was also provided with contact information for the pharmacy team.      An initial outreach was conducted, including assessment of therapy appropriateness and specialty medication education for Ozempic.     A follow-up outreach was conducted, including assessment of continued therapy appropriateness, medication adherence, and side effect incidence and management for Tresiba.    Insurance Coverage & Financial Support  [place] Medicaid    Prior Authorization for Ozempic was Approved    Approval Start Date: 08/02/2024  Approval End Date: 02/01/2025  Authorization / Reference / Case Number: [id number]    CoverMyMeds Key: [id number]    Scheduled new prior authorization renewal outreach task to be completed on 2/3/25.       Relevant Past Medical History and Comorbidities  Relevant medical history and concomitant health conditions were discussed with the patient. The patient's chart has been reviewed for relevant past medical history and comorbid conditions and updated as necessary.  Past Medical History:   Diagnosis Date    Bleeding in mouth     WHEN WAKES UP IN THE AM    Diabetes mellitus     Dry throat     AND IRRITATED WHEN WAKE UP MORNING    GERD (gastroesophageal reflux disease)     Hepatitis B 2014    TOOK MEDS    History of kidney stones      Social History     Socioeconomic  History    Marital status:    Tobacco Use    Smoking status: Former     Current packs/day: 0.00     Average packs/day: 1 pack/day for 27.0 years (27.0 ttl pk-yrs)     Types: Cigarettes     Start date: 1991     Quit date: 2018     Years since quittin.5    Smokeless tobacco: Never   Vaping Use    Vaping status: Every Day    Substances: Nicotine    Devices: Refillable tank   Substance and Sexual Activity    Alcohol use: Not Currently    Drug use: Not Currently     Comment: hx of narcotic pain medication SOBER SINCE 2022    Sexual activity: Yes     Partners: Female     Comment: Wife       Problem list reviewed by Mckayla Nayak RPH on 2024 at  3:51 PM    Hospitalizations and Urgent Care Since Last Assessment  ED Visits, Admissions, or Hospitalizations: None reported  Urgent Office Visits: None reported    Allergies  Known allergies and reactions were discussed with the patient. The patient's chart has been reviewed for  allergy information and updated as necessary.   Allergies   Allergen Reactions    Metformin Diarrhea and Nausea And Vomiting       Allergies reviewed by Mckayla Nayak RPH on 2024 at  3:51 PM    Relevant Laboratory Values  Relevant laboratory values were discussed with the patient. The following specialty medication dose adjustment(s) are recommended: No adjustments needed at this time.   A1C Last 3 Results          2024    08:25 2024    09:40   HGBA1C Last 3 Results   Hemoglobin A1C 12.7  7.80      Lab Results   Component Value Date    HGBA1C 7.80 (H) 2024     Lab Results   Component Value Date    GLUCOSE 208 (H) 2024    CALCIUM 10.0 2024     2024    K 4.8 2024    CO2 22.7 2024     2024    BUN 16 2024    CREATININE 0.97 2024    BCR 16.5 2024    ANIONGAP 14.3 2024     Lab Results   Component Value Date    CHOL 205 (H) 2024    CHLPL 209 (H) 2024    TRIG 324 (H) 2024    HDL  26 (L) 08/02/2024     (H) 08/02/2024     Microalbumin          2/12/2024    08:25 8/2/2024    09:40   Microalbumin   Microalbumin, Urine <3.0  <1.2        Current Medication List  This medication list has been reviewed with the patient and evaluated for any interactions or necessary modifications/recommendations, and updated to include all prescription medications, OTC medications, and supplements the patient is currently taking.  This list reflects what is contained in the patient's profile, which has also been marked as reviewed to communicate to other providers it is the most up to date version of the patient's current medication therapy.     Current Outpatient Medications:     Blood Glucose Monitoring Suppl (OneTouch Verio Reflect) w/Device kit, Use 1 each Daily., Disp: 1 kit, Rfl: 0    Continuous Glucose  (FreeStyle Mike 3 [place]) device, Use 1 each Take As Directed., Disp: 1 each, Rfl: 0    Continuous Glucose Sensor (FreeStyle Mike 3 Sensor) misc, Use 1 each Every 14 (Fourteen) Days., Disp: 2 each, Rfl: 3    docusate sodium (COLACE) 50 MG capsule, Take 1 capsule by mouth 2 (Two) Times a Day., Disp: , Rfl:     gabapentin (NEURONTIN) 300 MG capsule, Take 2 capsules by mouth 3 (Three) Times a Day., Disp: 180 capsule, Rfl: 4    glucose blood test strip, Use 1 test strip 4 (Four) Times a Day. One touch verio, Disp: 100 each, Rfl: 3    insulin degludec (Tresiba FlexTouch) 100 UNIT/ML solution pen-injector injection, Inject 24 Units under the skin into the appropriate area as directed Daily., Disp: 30 mL, Rfl: 0    Insulin Lispro, 1 Unit Dial, (HumaLOG KwikPen) 100 UNIT/ML solution pen-injector, Inject 12 Units under the skin into the appropriate area as directed 3 (Three) Times a Day With Meals., Disp: 45 mL, Rfl: 0    Insulin Pen Needle (Pen Needles) 31G X 5 MM misc, Use 1 each 4 (Four) Times a Day., Disp: 200 each, Rfl: 2    Lancets misc, Use 1 lancet 4 (Four) Times a Day., Disp: 100 each, Rfl:  3    METHADONE HCL PO, Take 1 tablet by mouth Daily. FOLLOW INSTRUCTIONS PER PRESCRIBER, Disp: , Rfl:     mirtazapine (Remeron) 15 MG tablet, Take 1 tablet by mouth At Night As Needed (sleep)., Disp: 90 tablet, Rfl: 0    pantoprazole (PROTONIX) 40 MG EC tablet, Take 1 tablet by mouth Daily., Disp: 90 tablet, Rfl: 3    polyethylene glycol (MIRALAX) 17 g packet, Take 17 g by mouth Daily., Disp: , Rfl:     propranolol (INDERAL) 10 MG tablet, Take 1 tablet by mouth 2 (Two) Times a Day As Needed (anxiety)., Disp: 60 tablet, Rfl: 0    Semaglutide,0.25 or 0.5MG/DOS, (Ozempic, 0.25 or 0.5 MG/DOSE,) 2 MG/3ML solution pen-injector, Inject 0.5 mg under the skin into the appropriate area as directed 1 (One) Time Per Week., Disp: 3 mL, Rfl: 0    sertraline (Zoloft) 100 MG tablet, Take 1 tablet by mouth Daily., Disp: 90 tablet, Rfl: 0    Medicines reviewed by Mckayla Nayak [facility] on 8/2/2024 at  3:51 PM    Drug Interactions (per [employer]omp)  Gabapentin and methadone  Avoid concomitant use of opioid agonists and benzodiazepines or other CNS depressants when possible. These agents should only be combined if alternative treatment options are inadequate. If combined, limit the dosages and duration of each drug to the minimum possible while achieving the desired clinical effect and consider dose reductions of the opioid or CNS depressant upon initiation. Warn patients and caregivers about the risk of slowed or difficult breathing and/or sedation and consider prescribing naloxone for the emergency treatment of opioid overdose. A separate FDA safety communication says patents requiring methadone or buprenorphine for treatment of opioid dependence should not be excluded from such treatment due to the concomitant use of a CNS depressant. Close monitoring for adverse effects with any combined use is warranted.  Humalog, Ozempic and Tresiba  Monitor patients for hypoglycemia.  Mirtazapine and methadone   Avoid concomitant use of opioid  agonists and benzodiazepines or other CNS depressants when possible. These agents should only be combined if alternative treatment options are inadequate. If combined, limit the dosages and duration of each drug to the minimum possible while achieving the desired clinical effect and consider dose reductions of the opioid or CNS depressant upon initiation. Warn patients and caregivers about the risk of slowed or difficult breathing and/or sedation and consider prescribing naloxone for the emergency treatment of opioid overdose. A separate FDA safety communication says patents requiring methadone or buprenorphine for treatment of opioid dependence should not be excluded from such treatment due to the concomitant use of a CNS depressant. Close monitoring for adverse effects with any combined use is warranted.    Recommended Medications Assessment  Aspirin: Not Taking Currently  Statin: Not Taking Currently  ACEi/ARB: Not Taking Currently    Initial Education Provided for Specialty Medication  The patient has been provided with the following education and any applicable administration techniques (i.e. self-injection) have been demonstrated for the therapies indicated. All questions and concerns have been addressed prior to the patient receiving the medication, and the patient has verbalized comprehension of the education and any materials provided. Additional patient education shall be provided and documented upon request by the patient, provider, or payer.    Ozempic® (semaglutide)  Medication Expectations   Why am I taking this medication? You are taking Ozempic, along with diet and exercise, to lower blood sugar because you have type 2 diabetes. You may also be taking Ozempic if you have diabetes and cardiovascular disease to reduce your risk of heart attack or stroke.    What should I expect while on this medication? You should expect to see your blood sugar, A1c and possibly weight decrease over time.   How does  the medication work? Ozempic is a non-insulin injection that works with your body to release insulin in response to your blood sugar rising.  This medication also slows down food from leaving your stomach, making you feel velazquez for longer.   How long will I be on this medication for? The amount of time you will be on this medication will be determined by your doctor based on blood sugar and A1c control. You will most likely be on this medication or another diabetes medication throughout your lifetime. Do not abruptly stop this medication without talking to your doctor first.    How do I take this medication? Take as directed on your prescription label. Ozempic is injected under the skin (subcutaneously) of your stomach, thigh or upper arm.  Use this medication once weekly, on the same day each week, and it can be given with or without food.    What are some possible side effects? You may notice you don't feel as hungry, especially when you first start using Ozempic.  The most common side effects are nausea, stomach cramping, and constipation. Stop using Ozempic and call your doctor immediately if you have severe pain in your stomach area that will not go away.    What happens if I miss a dose? If you miss a dose, take it as soon as you remember as long as it is within 5 days after your missed dose.  If more than 5 days have passed, skip the missed dose and resume Ozempic on the regularly scheduled day.     Medication Safety   What are things I should warn my doctor immediately about? Do not use Ozempic if you or a family member have ever had medullary thyroid cancer (MTC) or Multiple Endocrine Neoplasia syndrome type 2 (MEN 2).  Tell your doctor if you have or have had problems with your kidneys or pancreas, or if you are pregnant, planning to become pregnant. Stop using Ozempic and get medical help right away if you have severe pain that will not go away, or if you notice any signs/symptoms of an allergic reaction  (rash, hives, difficulty breathing, etc.).    What are things that I should be cautious of? Be cautious of any side effects from this medication. Talk to your doctor if any new ones develop or aren't getting better.    What are some medications that can interact with this one? Taking Ozempic with other medications that also lower your blood sugar such as insulin and glipizide/glimepiride/glyburide may increase the risk of low blood sugar. Your doctor may reduce the dose of these medications when you start Ozempic.  Always tell your doctor or pharmacist immediately if you start taking any new medications, including over-the-counter medications, vitamins, and herbal supplements.       Medication Storage/Handling   How should I handle this medication? Keep this medication out of reach of pets/children and keep the pen capped    How does this medication need to be stored? Store in the refrigerator prior to first use, but do not freeze.  After first use, you may continue to store in the refrigerator or at room temperature for 56 days.  Protect from excessive heat and sunlight.   How should I dispose of this medication? Used Ozempic pens should be thrown away after 56 days, even if medication remains. If your doctor decides to stop this medication, take to your local police station for proper disposal. Some pharmacies also have take-back bins for medication drop-off.      Resources/Support   How can I remind myself to take this medication? You can download reminder apps to help you manage your refills. You may also set an alarm on your phone to remind you.    Is financial support available?  [employer] can provide co-pay cards if you have commercial insurance or patient assistance if you have Medicare or no insurance.    Which vaccines are recommended for me? Talk to your doctor about these vaccines: Flu, Coronavirus (COVID-19), Pneumococcal (pneumonia), Tdap, Hepatitis B, Zoster (shingles)          Adherence,  Self-Administration, and Current Therapy Problems  Adherence related to the patient's specialty therapy was discussed with the patient. The Adherence segment of this outreach has been reviewed and updated.     Is there a concern with patient's ability to self administer the medication correctly and without issue?: No  Were any potential barriers to adherence identified during the initial assessment or patient education?: No  Are there any concerns regarding the patient's understanding of the importance of medication adherence?: No    Adherence Questions  Linked Medication(s) Assessed: Continuous Glucose Sensor, Insulin Degludec, Insulin Lispro  On average, how many doses/injections does the patient miss per month?: 0  What are the identified reasons for non-adherence or missed doses? : no problems identified  What is the estimated medication adherence level?: %  Based on the patient/caregiver response and refill history, does this patient require an MTP to track adherence improvements?: no    Additional Barriers to Patient Self-Administration: None identified or disclosed  Methods for Supporting Patient Self-Administration: Continue to follow with fills and clinical assessments     Open Medication Therapy Problems  No medication therapy recommendations to display    Adverse Drug Reactions  Medication tolerability: Tolerating with no to minimal ADRs  Medication plan: Continue therapy with normal follow-up  Plan for ADR Management: No ADRs reported     Goals of Therapy  Goals related to the patient's specialty therapy were discussed with the patient. The Patient Goals segment of this outreach has been reviewed and updated.   Goals Addressed Today        Specialty Pharmacy General Goal      A1c < 7%    Lab Results   Component Value Date    HGBA1C 7.80 (H) 08/02/2024    HGBA1C 12.7 (H) 02/12/2024    HGBA1C 8.3 (H) 09/15/2018    HGBA1C 9.1 (H) 09/10/2018    HGBA1C 9.6 (H) 09/09/2018                Quality of Life  Assessment   Quality of Life related to the patient's enrollment in the patient management program and services provided was discussed with the patient. The QOL segment of this outreach has been reviewed and updated.    Quality of Life Improvement Scale: 8-Moderately better    Reassessment Plan & Follow-Up  1. Medication Therapy Changes:   Will increase the dose of Tresiba to 30 units daily  If fasting blood sugar less than 90 on 2 consecutive days can decrease the dose of Tresiba by 2 units  Take Humalog 15 units with meals  Will start Ozempic 0.25 mg weekly if able to tolerate can increase the dose to 0.5 mg in 4 weeks  2. Related Plans, Therapy Recommendations, or Therapy Problems to Be Addressed: No issues   3. Pharmacist to perform regular assessments no more than (6) months from the previous assessment.  4. Care Coordinator to set up future refill outreaches, coordinate prescription delivery, and escalate clinical questions to pharmacist.    Attestation  Therapeutic appropriateness: Appropriate   I attest the patient was actively involved in and has agreed to the above plan of care. If the prescribed therapy is at any point deemed not appropriate based on the current or future assessments, a consultation will be initiated with the patient's specialty care provider to determine the best course of action. The revised plan of therapy will be documented along with any required assessments and/or additional patient education provided.     Dana Nayak, Jeremy, MM  Clinical Specialty Pharmacist, Endocrinology  8/5/2024  07:55 EDT

## 2024-08-02 NOTE — ASSESSMENT & PLAN NOTE
Hormonal workup unremarkable  CT adrenal showed low [place] adrenal mass  Will continue to monitor

## 2024-08-02 NOTE — PATIENT INSTRUCTIONS
[facility]  [address]  [place] [address]    Today's Date: 8/2/2024    Insulin Dosing Instructions    YOUR INSULIN DOSING HAS BEEN CHANGED TO: Before Breakfast Before Lunch Before Evening Meal Bedtime   Long-Acting Insulins Basaglar (Glargine)  Lantus (Glargine)  Levemir (Detemir)  Semglee (Glargine)  Toujeo (Glargine)  Tresiba (Degludec) 30      Rapid-Acting Insulins Admelog (Lispro)  Apidra (Glulisine)  Flasp (Aspart)  Humalog (Lispro)  Lyumjev (Lispro)  Novolog (Aspart) 15 15 15    NPH Insulin Humulin/Novolin N       Regular Insulin Humulin/Novolin R       Premixed Insulin Humalog/Humulin  Novolog/Novolin  Mix 70/30, 75/25, or 50/50       Sliding Scale for Rapid-acting   or regular insulin based on blood sugar 151-200 + + + +    201-250 + + + +    251-300 + + + +    301-350 + + + +    >350 + + + +     Adjusting your long-acting insulin:      If pre-breakfast sugar is below 90 on 2 consecutive days, then decrease your long-acting insulin/ Tresiba  by 2 units.

## 2024-08-05 DIAGNOSIS — E78.5 HYPERLIPIDEMIA ASSOCIATED WITH TYPE 2 DIABETES MELLITUS: ICD-10-CM

## 2024-08-05 DIAGNOSIS — E11.65 TYPE 2 DIABETES MELLITUS WITH HYPERGLYCEMIA, WITH LONG-TERM CURRENT USE OF INSULIN: Primary | ICD-10-CM

## 2024-08-05 DIAGNOSIS — Z79.4 TYPE 2 DIABETES MELLITUS WITH HYPERGLYCEMIA, WITH LONG-TERM CURRENT USE OF INSULIN: Primary | ICD-10-CM

## 2024-08-05 DIAGNOSIS — E11.69 HYPERLIPIDEMIA ASSOCIATED WITH TYPE 2 DIABETES MELLITUS: ICD-10-CM

## 2024-08-05 RX ORDER — INSULIN DEGLUDEC 100 U/ML
30 INJECTION, SOLUTION SUBCUTANEOUS DAILY
Qty: 30 ML | Refills: 1 | Status: SHIPPED | OUTPATIENT
Start: 2024-08-05

## 2024-08-05 RX ORDER — INSULIN LISPRO 100 [IU]/ML
15 INJECTION, SOLUTION INTRAVENOUS; SUBCUTANEOUS
Qty: 45 ML | Refills: 1 | Status: SHIPPED | OUTPATIENT
Start: 2024-08-05

## 2024-08-05 RX ORDER — ROSUVASTATIN CALCIUM 5 MG/1
5 TABLET, COATED ORAL NIGHTLY
Qty: 90 TABLET | Refills: 3 | Status: SHIPPED | OUTPATIENT
Start: 2024-08-05 | End: 2025-08-05

## 2024-08-13 ENCOUNTER — TRANSCRIBE ORDERS (OUTPATIENT)
Dept: ADMINISTRATIVE | Facility: HOSPITAL | Age: 44
End: 2024-08-13
Payer: COMMERCIAL

## 2024-08-13 DIAGNOSIS — R91.8 LUNG NODULES: Primary | ICD-10-CM

## 2024-08-14 ENCOUNTER — SPECIALTY PHARMACY (OUTPATIENT)
Dept: ENDOCRINOLOGY | Age: 44
End: 2024-08-14
Payer: COMMERCIAL

## 2024-08-14 RX ORDER — BLOOD-GLUCOSE SENSOR
1 EACH MISCELLANEOUS
Qty: 2 EACH | Refills: 3 | Status: SHIPPED | OUTPATIENT
Start: 2024-08-14

## 2024-08-14 NOTE — TELEPHONE ENCOUNTER
Specialty Pharmacy Patient Management Program  Prescription Refill Request     Patient currently fills medications at  Pharmacy. Needing refill(s) on the following:      Requested Prescriptions     Pending Prescriptions Disp Refills    polyethylene glycol (MiraLax) 17 g packet 30 packet 1     Sig: Take 17 g by mouth Daily.       Pended for Michelle THAKUR to review, and approve if appropriate.

## 2024-08-14 NOTE — TELEPHONE ENCOUNTER
Rx Refill Note  Requested Prescriptions     Pending Prescriptions Disp Refills    Continuous Glucose Sensor (FreeStyle Mike 3 Sensor) misc 2 each 3     Sig: Use 1 each Every 14 (Fourteen) Days.      Last office visit with prescribing clinician: 8/2/2024   Last telemedicine visit with prescribing clinician: Visit date not found   Next office visit with prescribing clinician: 12/13/2024                         Would you like a call back once the refill request has been completed: [] Yes [] No    If the office needs to give you a call back, can they leave a voicemail: [] Yes [] No    Marium Toledo MA  08/14/24, 13:20 EDT

## 2024-08-18 RX ORDER — POLYETHYLENE GLYCOL 3350 17 G/17G
17 POWDER, FOR SOLUTION ORAL DAILY
Qty: 30 PACKET | Refills: 1 | Status: SHIPPED | OUTPATIENT
Start: 2024-08-18 | End: 2024-08-19 | Stop reason: SDUPTHER

## 2024-08-19 ENCOUNTER — SPECIALTY PHARMACY (OUTPATIENT)
Dept: ENDOCRINOLOGY | Age: 44
End: 2024-08-19
Payer: COMMERCIAL

## 2024-08-19 RX ORDER — POLYETHYLENE GLYCOL 3350 17 G/17G
17 POWDER, FOR SOLUTION ORAL DAILY
Qty: 510 G | Refills: 1 | Status: SHIPPED | OUTPATIENT
Start: 2024-08-19

## 2024-08-19 NOTE — PROGRESS NOTES
Specialty Pharmacy Refill Coordination Note     Guilherme is a 44 y.o. male contacted today regarding refills of  2 specialty medication(s).    Reviewed and verified with patient:       Specialty medication(s) and dose(s) confirmed: yes    Refill Questions      Flowsheet Row Most Recent Value   Changes to allergies? No   Changes to medications? No   New conditions or infections since last clinic visit No   Unplanned office visit, urgent care, ED, or hospital admission in the last 4 weeks  No   How does patient/caregiver feel medication is working? Good   Financial problems or insurance changes  No   Since the previous refill, were any specialty medication doses or scheduled injections missed or delayed?  No   Does this patient require a clinical escalation to a pharmacist? No            Delivery Questions      Flowsheet Row Most Recent Value   Delivery method Other (Comment)   Delivery address verified with patient/caregiver? Yes   Delivery address Home   Number of medications in delivery 2   Medication(s) being filled and delivered Polyethylene Glycol 3350 (Laxatives - Miscellaneous), Continuous Glucose Sensor   Doses left of specialty medications 1 week   Copay verified? Yes   Copay amount $0   Copay form of payment No copayment ($0)   Ship Date 08/20   Delivery Date 08/21   Signature Required Yes                   Follow-up: 25 day(s)     Lanny Wilkins, Pharmacy Technician  Specialty Pharmacy Technician

## 2024-08-19 NOTE — TELEPHONE ENCOUNTER
Specialty Pharmacy Patient Management Program  Prescription Refill Request     Patient currently fills medications at  Pharmacy. Needing refill(s) on the following:      Requested Prescriptions     Pending Prescriptions Disp Refills    polyethylene glycol (MiraLax) 17 GM/SCOOP powder 510 g 1     Sig: Take 17 g by mouth Daily.       Pended for Dr. Nunn to review, and approve if appropriate.

## 2024-08-26 ENCOUNTER — SPECIALTY PHARMACY (OUTPATIENT)
Dept: ENDOCRINOLOGY | Age: 44
End: 2024-08-26
Payer: COMMERCIAL

## 2024-08-26 RX ORDER — COLCHICINE 0.6 MG/1
0.6 TABLET ORAL DAILY
Qty: 30 TABLET | Refills: 0 | Status: CANCELLED | OUTPATIENT
Start: 2024-08-26

## 2024-08-26 NOTE — TELEPHONE ENCOUNTER
LAST OFFICE VISIT - THIS DEPT  4/26/2024 Michelle Cabral PA    This looks like it was discontinued.

## 2024-08-26 NOTE — TELEPHONE ENCOUNTER
I tried to call patient to advise him that we got a medication refill request for his Colchicine but its looks like this medication was discontinued and he will need to be seen if he is having any concerns for feels the need for treatment. TRC     HUB OKAY TO READ

## 2024-08-26 NOTE — PROGRESS NOTES
Specialty Pharmacy Refill Coordination Note     Guilherme is a 44 y.o. male contacted today regarding refills of  2 specialty medication(s).    Reviewed and verified with patient:       Specialty medication(s) and dose(s) confirmed: yes    Refill Questions      Flowsheet Row Most Recent Value   Changes to allergies? No   Changes to medications? No   New conditions or infections since last clinic visit No   Unplanned office visit, urgent care, ED, or hospital admission in the last 4 weeks  No   How does patient/caregiver feel medication is working? Good   Financial problems or insurance changes  No   Since the previous refill, were any specialty medication doses or scheduled injections missed or delayed?  No   Does this patient require a clinical escalation to a pharmacist? No            Delivery Questions      Flowsheet Row Most Recent Value   Delivery method [employer]   Delivery address verified with patient/caregiver? Yes   Delivery address Home   Number of medications in delivery 1   Medication(s) being filled and delivered Semaglutide   Doses left of specialty medications 1 week   Copay verified? Yes   Copay amount $0   Copay form of payment No copayment ($0)   Ship Date 08/26   Delivery Date 08/27   Signature Required No                   Follow-up: 25 day(s)     Lanny Wilkins, Pharmacy Technician  Specialty Pharmacy Technician

## 2024-08-26 NOTE — TELEPHONE ENCOUNTER
Medication was discontinued.  Patient should be seen if he is having any concerns or feels the need for treatment.

## 2024-08-27 NOTE — TELEPHONE ENCOUNTER
Name: Guilherme Garcia    Relationship: Self    Best Callback Number: [phone]     HUB PROVIDED THE RELAY MESSAGE FROM THE OFFICE   PATIENT VOICED UNDERSTANDING AND HAS NO FURTHER QUESTIONS AT THIS TIME    ADDITIONAL INFORMATION:

## 2024-09-10 ENCOUNTER — TELEMEDICINE (OUTPATIENT)
Dept: PSYCHIATRY | Facility: CLINIC | Age: 44
End: 2024-09-10
Payer: COMMERCIAL

## 2024-09-10 DIAGNOSIS — F43.10 POST TRAUMATIC STRESS DISORDER (PTSD): Chronic | ICD-10-CM

## 2024-09-10 DIAGNOSIS — F51.05 INSOMNIA DUE TO MENTAL CONDITION: ICD-10-CM

## 2024-09-10 DIAGNOSIS — F41.0 PANIC DISORDER WITHOUT AGORAPHOBIA: Primary | Chronic | ICD-10-CM

## 2024-09-10 PROCEDURE — 1159F MED LIST DOCD IN RCRD: CPT

## 2024-09-10 PROCEDURE — 99214 OFFICE O/P EST MOD 30 MIN: CPT

## 2024-09-10 PROCEDURE — 1160F RVW MEDS BY RX/DR IN RCRD: CPT

## 2024-09-10 RX ORDER — MIRTAZAPINE 15 MG/1
15 TABLET, FILM COATED ORAL NIGHTLY PRN
Qty: 90 TABLET | Refills: 0 | Status: SHIPPED | OUTPATIENT
Start: 2024-09-10

## 2024-09-10 RX ORDER — SERTRALINE HYDROCHLORIDE 100 MG/1
100 TABLET, FILM COATED ORAL DAILY
Qty: 90 TABLET | Refills: 0 | Status: SHIPPED | OUTPATIENT
Start: 2024-09-10

## 2024-09-10 RX ORDER — PROPRANOLOL HYDROCHLORIDE 10 MG/1
10 TABLET ORAL 2 TIMES DAILY PRN
Qty: 180 TABLET | Refills: 0 | Status: SHIPPED | OUTPATIENT
Start: 2024-09-10

## 2024-09-10 RX ORDER — HYDROXYZINE HYDROCHLORIDE 25 MG/1
25 TABLET, FILM COATED ORAL 3 TIMES DAILY PRN
Qty: 90 TABLET | Refills: 0 | Status: SHIPPED | OUTPATIENT
Start: 2024-09-10

## 2024-09-10 NOTE — PROGRESS NOTES
This provider is located at [place], [place]. The Patient is seen remotely using Video. Patient is being seen via telehealth and confirm that they are in a secure environment for this session. Patient is located in [place], [place] in his car. The patient's condition being diagnosed/treated is appropriate for telemedicine. Provider identified as Karime Boyd as well as credentials APRN MSN PMHNP-BC.   The client/patient gave consent to be seen remotely, and when consent is given they understand that the consent allows for patient identifiable information to be sent to a third party as needed.  They may refuse to be seen remotely at any time. The electronic data is encrypted and password protected, and the patient has been advised of the potential risks to privacy not withstanding such measures.    Chief Complaint  Anxiety, PTSD, and Sleeping Problem    Subjective        Guilherme Garcia presents to [facility] for   History of Present Illness  Patient is seen today for follow-up visit for anxiety, PTSD, and insomnia.  Patient reports he is doing well.  States he is back to work.  He states that Zoloft and Remeron continue to help him.  States Remeron he was helpful for sleep.  Appetite is good.  Denies depression.  Denies hopelessness.  Denies any suicidal or homicidal ideation.  He states anxiety has been well-managed.  He states he has got anxious when driving a time or 2 and has used propranolol.  States he also had some leftover hydroxyzine from a previous provider.  He states he forgot how good that worked for his anxiety.  He is asking this APRN to give him a prescription for that.  He denies any manic type symptoms.  Denies any paranoia.  Denies any auditory or visual hallucinations.  Denies any side effects to the medications.  He request to be seen in 3 months.  Objective   Vital Signs:   There were no vitals taken for this visit.      Mental Status Exam:   Hygiene:    good  Cooperation:  Cooperative  Eye Contact:  Good  Psychomotor Behavior:  Appropriate  Affect:  Full range  Mood: normal  Speech:  Normal  Thought Process:  Goal directed  Thought Content:  Normal  Suicidal:  None  Homicidal:  None  Hallucinations:  None  Delusion:  None  Memory:  Intact  Orientation:  Person, Place, Time, and Situation  Reliability:  good  Insight:  Good  Judgement:  Good  Impulse Control:  Good  Physical/Medical Issues:  No      PHQ-9 Depression Screening  Little interest or pleasure in doing things?     Feeling down, depressed, or hopeless?     Trouble falling or staying asleep, or sleeping too much?     Feeling tired or having little energy?     Poor appetite or overeating?     Feeling bad about yourself - or that you are a failure or have let yourself or your family down?     Trouble concentrating on things, such as reading the newspaper or watching television?     Moving or speaking so slowly that other people could have noticed? Or the opposite - being so fidgety or restless that you have been moving around a lot more than usual?     Thoughts that you would be better off dead, or of hurting yourself in some way?     PHQ-9 Total Score     If you checked off any problems, how difficult have these problems made it for you to do your work, take care of things at home, or get along with other people?          PHQ-9 Total Score:       RHONDA 7 anxiety screening tool that patient filled out virtually reviewed by this APRN at today's encounter.    Previous Provider notes and available records reviewed by this APRN today.   Current Medications:   Current Outpatient Medications   Medication Sig Dispense Refill    Blood Glucose Monitoring Suppl (OneTouch Verio Reflect) w/Device kit Use 1 each Daily. 1 kit 0    Continuous Glucose  (FreeStyle Mike 3 Alden) device Use 1 each Take As Directed. 1 each 0    Continuous Glucose Sensor (FreeStyle Mike 3 Sensor) misc Use 1 Sensor Every 14 (Fourteen)  Days. 2 each 3    docusate sodium (COLACE) 50 MG capsule Take 1 capsule by mouth 2 (Two) Times a Day.      gabapentin (NEURONTIN) 300 MG capsule Take 2 capsules by mouth 3 (Three) Times a Day. 180 capsule 4    glucose blood test strip Use 1 test strip 4 (Four) Times a Day. One touch verio 100 each 3    hydrOXYzine (ATARAX) 25 MG tablet Take 1 tablet by mouth 3 (Three) Times a Day As Needed for Anxiety. 90 tablet 0    insulin degludec (Tresiba FlexTouch) 100 UNIT/ML solution pen-injector injection Inject 30 Units under the skin into the appropriate area as directed Daily. 30 mL 0    Insulin Lispro, 1 Unit Dial, (HumaLOG KwikPen) 100 UNIT/ML solution pen-injector Inject 15 Units under the skin into the appropriate area as directed 3 (Three) Times a Day With Meals. 45 mL 1    Insulin Pen Needle (Pen Needles) 31G X 5 MM misc Use 1 each 4 (Four) Times a Day. 200 each 2    Lancets misc Use 1 lancet 4 (Four) Times a Day. 100 each 3    METHADONE HCL PO Take 1 tablet by mouth Daily. FOLLOW INSTRUCTIONS PER PRESCRIBER      mirtazapine (Remeron) 15 MG tablet Take 1 tablet by mouth At Night As Needed (sleep). 90 tablet 0    pantoprazole (PROTONIX) 40 MG EC tablet Take 1 tablet by mouth Daily. 90 tablet 3    polyethylene glycol (MiraLax) 17 GM/SCOOP powder Take 17 g by mouth Daily. 510 g 1    propranolol (INDERAL) 10 MG tablet Take 1 tablet by mouth 2 (Two) Times a Day As Needed (anxiety). 180 tablet 0    rosuvastatin (Crestor) 5 MG tablet Take 1 tablet by mouth Every Night. 90 tablet 3    Semaglutide,0.25 or 0.5MG/DOS, (Ozempic, 0.25 or 0.5 MG/DOSE,) 2 MG/3ML solution pen-injector Inject 0.5 mg under the skin into the appropriate area as directed 1 (One) Time Per Week. 3 mL 0    sertraline (Zoloft) 100 MG tablet Take 1 tablet by mouth Daily. 90 tablet 0     No current facility-administered medications for this visit.       Physical Exam   Result Review :    The following data was reviewed by: TODD Pimentel on  09/10/2024:  Common labs          2/26/2024    12:18 2/26/2024    14:44 5/2/2024    12:34 8/2/2024    09:40   Common Labs   Glucose  134  137  208    BUN  11  18  16    Creatinine  0.95  0.84  0.97    Sodium  141  139  137    Potassium  4.0  5.0  4.8    Chloride  105  100  100    Calcium  9.6  9.9  10.0    Albumin  4.7  4.1  4.2    Total Bilirubin  0.3  <0.2  0.4    Alkaline Phosphatase  54  61  73    AST (SGOT)  24  21  19    ALT (SGPT)  49  31  22    WBC 6.28       Hemoglobin 14.3       Hematocrit 41.9       Platelets 207       Total Cholesterol    205    Triglycerides    324    HDL Cholesterol    26    LDL Cholesterol     122    Hemoglobin A1C    7.80    Microalbumin, Urine    <1.2         Assessment and Plan   Problem List Items Addressed This Visit          Mental Health    Post traumatic stress disorder (PTSD)    Relevant Medications    mirtazapine (Remeron) 15 MG tablet    propranolol (INDERAL) 10 MG tablet    sertraline (Zoloft) 100 MG tablet    hydrOXYzine (ATARAX) 25 MG tablet    Panic disorder without agoraphobia - Primary    Relevant Medications    mirtazapine (Remeron) 15 MG tablet    propranolol (INDERAL) 10 MG tablet    sertraline (Zoloft) 100 MG tablet    hydrOXYzine (ATARAX) 25 MG tablet    Insomnia due to mental condition    Relevant Medications    mirtazapine (Remeron) 15 MG tablet    sertraline (Zoloft) 100 MG tablet    hydrOXYzine (ATARAX) 25 MG tablet     Discussed treatment options with patient.  Continue Zoloft 100 mg daily for panic and PTSD.  Continue Remeron 15 mg nightly for sleep/PTSD.  Continue propranolol 10 mg twice daily as needed for anxiety.  Start hydroxyzine 25 mg 3 times daily as needed for anxiety.  We will see patient again in 3 months to reassess.  Encouraged patient to contact the office if he has any issues sooner.    TREATMENT PLAN/GOALS: Continue supportive psychotherapy efforts and medications as indicated. Treatment and medication options discussed during today's visit.  Patient ackowledged and verbally consented to continue with current treatment plan and was educated on the importance of compliance with treatment and follow-up appointments.    DEPRESSION:  Patient screened positive for depression based on a PHQ-9 score of 5 on 6/5/2024. Follow-up recommendations include: Suicide Risk Assessment performed.       MEDICATION ISSUES:  We discussed risks, benefits, and side effects of the above medications and the patient was agreeable with the plan. Patient was educated on the importance of compliance with treatment and follow-up appointments.  Patient is agreeable to call the office with any worsening of symptoms or onset of side effects. Patient is agreeable to call 911 or go to the nearest ER should he/she begin having SI/HI.      Counseled patient regarding multimodal approach with healthy nutrition, healthy sleep, regular physical activity, social activities, counseling, and medications.      Coping skills reviewed and encouraged positive framing of thoughts     Assisted patient in processing above session content; acknowledged and normalized patient’s thoughts, feelings, and concerns.  Applied  positive coping skills and behavior management in session.  Allowed patient to freely discuss issues without interruption or judgment. Provided safe, confidential environment to facilitate the development of positive therapeutic relationship and encourage open, honest communication. Assisted patient in identifying risk factors which would indicate the need for higher level of care including thoughts to harm self or others and/or self-harming behavior and encouraged patient to contact this office, call 911, or present to the nearest emergency room should any of these events occur. Discussed crisis intervention services and means to access. If patient has any concerns or needs assistance they were instructed to call the [facility] at [phone].    MEDS ORDERED  DURING VISIT:  New Medications Ordered This Visit   Medications    mirtazapine (Remeron) 15 MG tablet     Sig: Take 1 tablet by mouth At Night As Needed (sleep).     Dispense:  90 tablet     Refill:  0    propranolol (INDERAL) 10 MG tablet     Sig: Take 1 tablet by mouth 2 (Two) Times a Day As Needed (anxiety).     Dispense:  180 tablet     Refill:  0    sertraline (Zoloft) 100 MG tablet     Sig: Take 1 tablet by mouth Daily.     Dispense:  90 tablet     Refill:  0    hydrOXYzine (ATARAX) 25 MG tablet     Sig: Take 1 tablet by mouth 3 (Three) Times a Day As Needed for Anxiety.     Dispense:  90 tablet     Refill:  0         Follow Up   Return in about 3 months (around 12/10/2024) for Video visit.    Patient was given instructions and counseling regarding his condition or for health maintenance advice. Please see specific information pulled into the AVS if appropriate.         This document has been electronically signed by TODD Pimentel  September 10, 2024 15:43 EDT    Part of this note may be an electronic transcription/translation of spoken language to printed text using the Dragon Dictation System.

## 2024-09-11 ENCOUNTER — SPECIALTY PHARMACY (OUTPATIENT)
Dept: ENDOCRINOLOGY | Age: 44
End: 2024-09-11
Payer: COMMERCIAL

## 2024-09-11 NOTE — PROGRESS NOTES
Specialty Pharmacy Refill Coordination Note     Guilherme is a 44 y.o. male contacted today regarding refills of  5 specialty medication(s).    Reviewed and verified with patient:       Specialty medication(s) and dose(s) confirmed: yes    Refill Questions      Flowsheet Row Most Recent Value   Changes to allergies? No   Changes to medications? No   New conditions or infections since last clinic visit No   Unplanned office visit, urgent care, ED, or hospital admission in the last 4 weeks  No   How does patient/caregiver feel medication is working? Good   Financial problems or insurance changes  No   Since the previous refill, were any specialty medication doses or scheduled injections missed or delayed?  No   Does this patient require a clinical escalation to a pharmacist? No            Delivery Questions      Flowsheet Row Most Recent Value   Delivery method UPS   Delivery address verified with patient/caregiver? Yes   Delivery address Home   Number of medications in delivery 5   Medication(s) being filled and delivered Gabapentin (Anticonvulsants - Misc.), Hydroxyzine Hcl (Antianxiety Agents - Misc.), Mirtazapine (Alpha-2 Receptor Antagonists (Tetracyclics)), Propranolol Hcl, Sertraline Hcl (Selective Serotonin Reuptake Inhibitors (Ssris))   Doses left of specialty medications 1 week   Copay verified? Yes   Copay amount 0   Copay form of payment No copayment ($0)   Ship Date 09/11   Delivery Date 09/12   Signature Required Yes                   Follow-up: 25 day(s)     Lanny Wilkins, Pharmacy Technician  Specialty Pharmacy Technician

## 2024-09-18 RX ORDER — SEMAGLUTIDE 0.68 MG/ML
0.5 INJECTION, SOLUTION SUBCUTANEOUS WEEKLY
Qty: 3 ML | Refills: 0 | Status: SHIPPED | OUTPATIENT
Start: 2024-09-18

## 2024-09-20 ENCOUNTER — OFFICE VISIT (OUTPATIENT)
Dept: SLEEP MEDICINE | Facility: HOSPITAL | Age: 44
End: 2024-09-20
Payer: COMMERCIAL

## 2024-09-20 ENCOUNTER — SPECIALTY PHARMACY (OUTPATIENT)
Dept: ENDOCRINOLOGY | Age: 44
End: 2024-09-20
Payer: COMMERCIAL

## 2024-09-20 VITALS
WEIGHT: 199 LBS | SYSTOLIC BLOOD PRESSURE: 118 MMHG | DIASTOLIC BLOOD PRESSURE: 76 MMHG | BODY MASS INDEX: 29.47 KG/M2 | OXYGEN SATURATION: 96 % | HEIGHT: 69 IN | HEART RATE: 87 BPM

## 2024-09-20 DIAGNOSIS — G47.33 OSA (OBSTRUCTIVE SLEEP APNEA): Primary | ICD-10-CM

## 2024-09-20 DIAGNOSIS — Z78.9 DIFFICULTY WITH CPAP FULL FACE MASK USE: ICD-10-CM

## 2024-09-20 PROCEDURE — G0463 HOSPITAL OUTPT CLINIC VISIT: HCPCS

## 2024-09-20 RX ORDER — INSULIN LISPRO 100 [IU]/ML
8 INJECTION, SOLUTION INTRAVENOUS; SUBCUTANEOUS 3 TIMES DAILY
Qty: 30 ML | Refills: 0 | Status: SHIPPED | OUTPATIENT
Start: 2024-09-20

## 2024-09-24 ENCOUNTER — TELEPHONE (OUTPATIENT)
Dept: ENDOCRINOLOGY | Age: 44
End: 2024-09-24
Payer: COMMERCIAL

## 2024-10-09 ENCOUNTER — SPECIALTY PHARMACY (OUTPATIENT)
Dept: ENDOCRINOLOGY | Age: 44
End: 2024-10-09
Payer: COMMERCIAL

## 2024-10-09 NOTE — PROGRESS NOTES
Specialty Pharmacy Refill Coordination Note     Guilherme is a 44 y.o. male contacted today regarding refills of  2 specialty medication(s).    Reviewed and verified with patient:       Specialty medication(s) and dose(s) confirmed: yes    Refill Questions      Flowsheet Row Most Recent Value   Changes to allergies? No   Changes to medications? No   New conditions or infections since last clinic visit No   Unplanned office visit, urgent care, ED, or hospital admission in the last 4 weeks  No   How does patient/caregiver feel medication is working? Good   Financial problems or insurance changes  No   Since the previous refill, were any specialty medication doses or scheduled injections missed or delayed?  No   Does this patient require a clinical escalation to a pharmacist? No            Delivery Questions      Flowsheet Row Most Recent Value   Delivery method UPS   Delivery address verified with patient/caregiver? Yes   Delivery address Home   Number of medications in delivery 2   Medication(s) being filled and delivered Polyethylene Glycol 3350 (Laxatives - Miscellaneous), Gabapentin (Anticonvulsants - Misc.)   Doses left of specialty medications 1 week   Copay verified? Yes   Copay amount 0   Copay form of payment No copayment ($0)   Ship Date 10/09   Delivery Date 10/10   Signature Required Yes                   Follow-up: 25 day(s)     Lanny Wilkins, Pharmacy Technician  Specialty Pharmacy Technician

## 2024-10-23 ENCOUNTER — SPECIALTY PHARMACY (OUTPATIENT)
Dept: ENDOCRINOLOGY | Age: 44
End: 2024-10-23
Payer: COMMERCIAL

## 2024-10-23 NOTE — PROGRESS NOTES
Specialty Pharmacy Patient Management Program  Endocrinology Reassessment     Guilherme Garcia was referred by an Endocrinology provider to the Endocrinology Patient Management program offered by [facility] for Type 2 Diabetes. A follow-up outreach was conducted, including assessment of continued therapy appropriateness, medication adherence, and side effect incidence and management for Ozempic and Tresiba.    Changes to Insurance Coverage or Financial Support  No changes    Relevant Past Medical History and Comorbidities  Relevant medical history and concomitant health conditions were discussed with the patient. The patient's chart has been reviewed for relevant past medical history and comorbid health conditions and updated as necessary.   Past Medical History:   Diagnosis Date    Bleeding in mouth     WHEN WAKES UP IN THE AM    Diabetes mellitus     Dry throat     AND IRRITATED WHEN WAKE UP MORNING    GERD (gastroesophageal reflux disease)     Hepatitis B     TOOK MEDS    History of kidney stones      Social History     Socioeconomic History    Marital status:    Tobacco Use    Smoking status: Former     Current packs/day: 0.00     Average packs/day: 1 pack/day for 27.0 years (27.0 ttl pk-yrs)     Types: Cigarettes     Start date: 1991     Quit date: 2018     Years since quittin.8    Smokeless tobacco: Never   Vaping Use    Vaping status: Every Day    Substances: Nicotine    Devices: Refillable tank   Substance and Sexual Activity    Alcohol use: Not Currently    Drug use: Not Currently     Comment: hx of narcotic pain medication SOBER SINCE 2022    Sexual activity: Yes     Partners: Female     Comment: Wife     Problem list reviewed by Shauna Botello, PharmD on 10/23/2024 at  9:49 AM    Hospitalizations and Urgent Care Since Last Assessment  ED Visits, Admissions, or Hospitalizations: none  Urgent Office Visits: none    Allergies  Known allergies and reactions were  Patient calling and stated, " I need Radha Amador to send a new script for my Gabapentin 100 mg    Since she ordered me the 30 day supply it seems to be working for me  " discussed with the patient. The patient's chart has been reviewed for allergy information and updated as necessary.   Allergies   Allergen Reactions    Metformin Diarrhea and Nausea And Vomiting     Allergies reviewed by Shauna Botello PharmD on 10/23/2024 at  9:48 AM    Relevant Laboratory Values  Relevant laboratory values were discussed with the patient. The following specialty medication dose adjustment(s) are recommended: n/a  A1C Last 3 Results          2/12/2024    08:25 8/2/2024    09:40   HGBA1C Last 3 Results   Hemoglobin A1C 12.7  7.80      Lab Results   Component Value Date    HGBA1C 7.80 (H) 08/02/2024     Lab Results   Component Value Date    GLUCOSE 208 (H) 08/02/2024    CALCIUM 10.0 08/02/2024     08/02/2024    K 4.8 08/02/2024    CO2 22.7 08/02/2024     08/02/2024    BUN 16 08/02/2024    CREATININE 0.97 08/02/2024    BCR 16.5 08/02/2024    ANIONGAP 14.3 08/02/2024     Lab Results   Component Value Date    CHOL 205 (H) 08/02/2024    CHLPL 209 (H) 02/12/2024    TRIG 324 (H) 08/02/2024    HDL 26 (L) 08/02/2024     (H) 08/02/2024     Microalbumin          2/12/2024    08:25 8/2/2024    09:40   Microalbumin   Microalbumin, Urine <3.0  <1.2      Current Medication List  This medication list has been reviewed with the patient and evaluated for any interactions or necessary modifications/recommendations, and updated to include all prescription medications, OTC medications, and supplements the patient is currently taking.  This list reflects what is contained in the patient's profile, which has also been marked as reviewed to communicate to other providers it is the most up to date version of the patient's current medication therapy.     Current Outpatient Medications:     Continuous Glucose Sensor (FreeStyle Mike 3 Sensor) misc, Use 1 Sensor Every 14 (Fourteen) Days., Disp: 2 each, Rfl: 3    docusate sodium (COLACE) 50 MG capsule, Take 1 capsule by mouth 2 (Two) Times a Day As Needed.,  Disp: , Rfl:     gabapentin (NEURONTIN) 300 MG capsule, Take 2 capsules by mouth 3 (Three) Times a Day., Disp: 180 capsule, Rfl: 4    hydrOXYzine (ATARAX) 25 MG tablet, Take 1 tablet by mouth 3 (Three) Times a Day As Needed for Anxiety., Disp: 90 tablet, Rfl: 0    insulin degludec (Tresiba FlexTouch) 100 UNIT/ML solution pen-injector injection, Inject 30 Units under the skin into the appropriate area as directed Daily., Disp: 30 mL, Rfl: 0    METHADONE HCL PO, Take 1 tablet by mouth Daily. FOLLOW INSTRUCTIONS PER PRESCRIBER, Disp: , Rfl:     mirtazapine (Remeron) 15 MG tablet, Take 1 tablet by mouth At Night As Needed for sleep., Disp: 90 tablet, Rfl: 0    pantoprazole (PROTONIX) 40 MG EC tablet, Take 1 tablet by mouth Daily., Disp: 90 tablet, Rfl: 3    polyethylene glycol (MiraLax) 17 GM/SCOOP powder, Take 17 g by mouth Daily., Disp: 510 g, Rfl: 1    propranolol (INDERAL) 10 MG tablet, Take 1 tablet by mouth 2 (Two) Times a Day As Needed for anxiety., Disp: 180 tablet, Rfl: 0    rosuvastatin (Crestor) 5 MG tablet, Take 1 tablet by mouth Every Night., Disp: 90 tablet, Rfl: 3    Semaglutide,0.25 or 0.5MG/DOS, (Ozempic, 0.25 or 0.5 MG/DOSE,) 2 MG/3ML solution pen-injector, Inject 0.5 mg under the skin into the appropriate area as directed 1 (One) Time Per Week., Disp: 3 mL, Rfl: 0    sertraline (Zoloft) 100 MG tablet, Take 1 tablet by mouth Daily., Disp: 90 tablet, Rfl: 0    Blood Glucose Monitoring Suppl (OneTouch Verio Reflect) w/Device kit, Use 1 each Daily., Disp: 1 kit, Rfl: 0    Continuous Glucose  (FreeStyle Mike 3 [place]) device, Use 1 each Take As Directed., Disp: 1 each, Rfl: 0    glucose blood test strip, Use 1 test strip 4 (Four) Times a Day. One touch verio, Disp: 100 each, Rfl: 3    Insulin Lispro, 1 Unit Dial, (HumaLOG KwikPen) 100 UNIT/ML solution pen-injector, Inject 8 Units under the skin into the appropriate area as directed 3 times a day., Disp: 30 mL, Rfl: 0    Insulin Pen Needle (Pen  Needles) 31G X 5 MM misc, Use 1 each 4 (Four) Times a Day., Disp: 200 each, Rfl: 2    Lancets misc, Use 1 lancet 4 (Four) Times a Day., Disp: 100 each, Rfl: 3    Medicines reviewed by Sahuna Botello, PharmD on 10/23/2024 at  9:33 AM  Medicines reviewed by Shauna Botello, PharmSARA on 10/23/2024 at  9:50 AM    Drug Interactions  none    Recommended Medications Assessment  Aspirin: Not Taking Currently  Statin: Currently Taking   ACEi/ARB: Not Taking Currently    Adverse Drug Reactions  Medication tolerability: Tolerating with no to minimal ADRs  Medication plan: Continue therapy with normal follow-up  Plan for ADR Management: n/a    Adherence, Self-Administration, and Current Therapy Problems  Adherence related to the patient's specialty therapy was discussed with the patient. The Adherence segment of this outreach has been reviewed and updated.     Adherence Questions  Linked Medication(s) Assessed: Continuous Glucose Sensor (FreeStyle Mike 3 Sensor), Semaglutide (Ozempic (0.25 or 0.5 MG/DOSE)), Insulin Degludec (Tresiba FlexTouch), Insulin Lispro (HUMALOG)  On average, how many doses/injections does the patient miss per month?: 0  What are the identified reasons for non-adherence or missed doses? : no problems identified  What is the estimated medication adherence level?: %  Based on the patient/caregiver response and refill history, does this patient require an MTP to track adherence improvements?: no    Additional Barriers to Patient Self-Administration: none  Methods for Supporting Patient Self-Administration: n/a    Open Medication Therapy Problems  No medication therapy recommendations to display    Goals of Therapy  Goals related to the patient's specialty therapy were discussed with the patient. The Patient Goals segment of this outreach has been reviewed and updated.   Goals Addressed Today        Specialty Pharmacy General Goal      A1c < 7%    Lab Results   Component Value Date    HGBA1C 7.80 (H)  08/02/2024    HGBA1C 12.7 (H) 02/12/2024    HGBA1C 8.3 (H) 09/15/2018    HGBA1C 9.1 (H) 09/10/2018    HGBA1C 9.6 (H) 09/09/2018     Reviewed 10/23/24: trending toward goal, no changes today              Quality of Life Assessment   Quality of Life related to the patient's enrollment in the patient management program and services provided was discussed with the patient. The QOL segment of this outreach has been reviewed and updated.  Quality of Life Improvement Scale: 8-Moderately better    Reassessment Plan & Follow-Up  1. Medication Therapy Changes: No changes today.  2. Related Plans, Therapy Recommendations, or Issues to Be Addressed: no issues identified  3. Pharmacist to perform regular assessments no more than (6) months from the previous assessment.  4. Care Coordinator to set up future refill outreaches, coordinate prescription delivery, and escalate clinical questions to pharmacist.    Attestation  Therapeutic appropriateness: Appropriate   I attest the patient was actively involved in and has agreed to the above plan of care.  If the prescribed therapy is at any point deemed not appropriate based on the current or future assessments, a consultation will be initiated with the patient's specialty care provider to determine the best course of action. The revised plan of therapy will be documented along with any required assessments and/or additional patient education provided.     Shauna Botello PharmD  Clinical Specialty Pharmacist, Endocrinology  10/23/2024  09:50 EDT    Discussed the aforementioned information with the patient via Telephone.

## 2024-10-25 ENCOUNTER — SPECIALTY PHARMACY (OUTPATIENT)
Dept: ENDOCRINOLOGY | Age: 44
End: 2024-10-25
Payer: COMMERCIAL

## 2024-10-25 NOTE — PROGRESS NOTES
Specialty Pharmacy Refill Coordination Note     Guilherme is a 44 y.o. male contacted today regarding refills of  1 specialty medication(s).    Reviewed and verified with patient:       Specialty medication(s) and dose(s) confirmed: yes    Refill Questions      Flowsheet Row Most Recent Value   Changes to allergies? No   Changes to medications? No   New conditions or infections since last clinic visit No   Unplanned office visit, urgent care, ED, or hospital admission in the last 4 weeks  No   How does patient/caregiver feel medication is working? Good   Financial problems or insurance changes  No   Since the previous refill, were any specialty medication doses or scheduled injections missed or delayed?  No   Does this patient require a clinical escalation to a pharmacist? No            Delivery Questions      Flowsheet Row Most Recent Value   Delivery method UPS   Delivery address verified with patient/caregiver? Yes   Delivery address Home   Number of medications in delivery 1   Medication(s) being filled and delivered Glucose Blood   Doses left of specialty medications 1   Copay verified? Yes   Copay amount 0   Copay form of payment No copayment ($0)   Ship Date 10/28   Delivery Date 10/29   Signature Required No                   Follow-up: 25 day(s)     Lanny Wilkins, Pharmacy Technician  Specialty Pharmacy Technician

## 2024-11-01 ENCOUNTER — SPECIALTY PHARMACY (OUTPATIENT)
Dept: ENDOCRINOLOGY | Age: 44
End: 2024-11-01
Payer: COMMERCIAL

## 2024-11-01 DIAGNOSIS — E11.65 TYPE 2 DIABETES MELLITUS WITH HYPERGLYCEMIA, WITHOUT LONG-TERM CURRENT USE OF INSULIN: ICD-10-CM

## 2024-11-01 RX ORDER — PEN NEEDLE, DIABETIC 30 GX3/16"
1 NEEDLE, DISPOSABLE MISCELLANEOUS 4 TIMES DAILY
Qty: 200 EACH | Refills: 2 | Status: SHIPPED | OUTPATIENT
Start: 2024-11-01

## 2024-11-01 NOTE — PROGRESS NOTES
Specialty Pharmacy Patient Management Program  Endocrinology Refill Outreach      Guilherme is a 44 y.o. male contacted today regarding refills of his medication(s).    Specialty medication(s) and dose(s) confirmed: tresiba    Refill Questions      Flowsheet Row Most Recent Value   Changes to allergies? No   Changes to medications? No   New conditions or infections since last clinic visit No   Unplanned office visit, urgent care, ED, or hospital admission in the last 4 weeks  No   How does patient/caregiver feel medication is working? Very good   Financial problems or insurance changes  No   Since the previous refill, were any specialty medication doses or scheduled injections missed or delayed?  No   Does this patient require a clinical escalation to a pharmacist? No          Delivery Questions      Flowsheet Row Most Recent Value   Delivery method UPS   Delivery address verified with patient/caregiver? Yes   Delivery address Home   Number of medications in delivery 5   Medication(s) being filled and delivered Rosuvastatin Calcium (CRESTOR), Insulin Degludec (Tresiba FlexTouch), Insulin Lispro (HUMALOG), Insulin Pen Needle, Polyethylene Glycol 3350 (MIRALAX)   Copay verified? Yes   Copay amount 0   Copay form of payment No copayment ($0)   Ship Date 11/4   Delivery Date 11/5   Signature Required No            Follow-Up: 50 days    Shauna Botello, PharmD  Clinical Specialty Pharmacist, Endocrinology  11/1/2024  14:38 EDT

## 2024-11-01 NOTE — TELEPHONE ENCOUNTER
Rx Refill Note  Requested Prescriptions     Pending Prescriptions Disp Refills    Insulin Pen Needle (Pen Needles) 31G X 5 MM misc 200 each 2     Sig: Use 1 each 4 (Four) Times a Day.      Last office visit with prescribing clinician: 8/2/2024   Last telemedicine visit with prescribing clinician: Visit date not found   Next office visit with prescribing clinician: 12/13/2024                         Would you like a call back once the refill request has been completed: [] Yes [] No    If the office needs to give you a call back, can they leave a voicemail: [] Yes [] No    Karen Lujan MA  11/01/24, 12:26 EDT

## 2024-11-03 RX ORDER — POLYETHYLENE GLYCOL 3350 17 G/17G
17 POWDER, FOR SOLUTION ORAL DAILY
Qty: 510 G | Refills: 1 | Status: SHIPPED | OUTPATIENT
Start: 2024-11-03

## 2024-11-06 ENCOUNTER — SPECIALTY PHARMACY (OUTPATIENT)
Dept: ENDOCRINOLOGY | Age: 44
End: 2024-11-06
Payer: COMMERCIAL

## 2024-11-06 NOTE — PROGRESS NOTES
Specialty Pharmacy Patient Management Program  Endocrinology Refill Outreach      Guilherme is a 44 y.o. male contacted today regarding refills of his medication(s).    Specialty medication(s) and dose(s) confirmed: n/a    Refill Questions      Flowsheet Row Most Recent Value   Changes to allergies? No   Changes to medications? No   New conditions or infections since last clinic visit No   Unplanned office visit, urgent care, ED, or hospital admission in the last 4 weeks  No   How does patient/caregiver feel medication is working? Very good   Financial problems or insurance changes  No   Since the previous refill, were any specialty medication doses or scheduled injections missed or delayed?  No   Does this patient require a clinical escalation to a pharmacist? No          Delivery Questions      Flowsheet Row Most Recent Value   Delivery method UPS   Delivery address verified with patient/caregiver? Yes   Delivery address Home   Number of medications in delivery 1   Medication(s) being filled and delivered Gabapentin (NEURONTIN)   Copay verified? Yes   Copay amount 0   Copay form of payment No copayment ($0)   Ship Date 11/11   Delivery Date 11/12   Signature Required Yes            Follow-Up: 30 days    Shauna Botello, PharmD  Clinical Specialty Pharmacist, Endocrinology  11/6/2024  09:32 EST

## 2024-11-12 ENCOUNTER — SPECIALTY PHARMACY (OUTPATIENT)
Dept: ENDOCRINOLOGY | Age: 44
End: 2024-11-12
Payer: COMMERCIAL

## 2024-11-12 NOTE — PROGRESS NOTES
Specialty Pharmacy Patient Management Program  Endocrinology Refill Outreach      Guilherme is a 44 y.o. male contacted today regarding refills of his medication(s).    Specialty medication(s) and dose(s) confirmed: CGM    Refill Questions      Flowsheet Row Most Recent Value   Changes to allergies? No   Changes to medications? No   New conditions or infections since last clinic visit No   Unplanned office visit, urgent care, ED, or hospital admission in the last 4 weeks  No   How does patient/caregiver feel medication is working? Very good   Financial problems or insurance changes  No   Since the previous refill, were any specialty medication doses or scheduled injections missed or delayed?  No   Does this patient require a clinical escalation to a pharmacist? No          Delivery Questions      Flowsheet Row Most Recent Value   Delivery method UPS   Delivery address verified with patient/caregiver? Yes   Delivery address Home   Number of medications in delivery 1   Medication(s) being filled and delivered Continuous Glucose Sensor (FreeStyle Mike 3 Sensor)   Copay verified? Yes   Copay amount 0   Copay form of payment No copayment ($0)   Ship Date 11/18   Delivery Date 11/19   Signature Required Yes            Follow-Up: 3 months    Shauna Botello, PharmD  Clinical Specialty Pharmacist, Endocrinology  11/12/2024  13:08 EST

## 2024-12-04 ENCOUNTER — TELEPHONE (OUTPATIENT)
Dept: ENDOCRINOLOGY | Age: 44
End: 2024-12-04
Payer: COMMERCIAL

## 2024-12-04 DIAGNOSIS — E11.65 TYPE 2 DIABETES MELLITUS WITH HYPERGLYCEMIA, WITHOUT LONG-TERM CURRENT USE OF INSULIN: Primary | ICD-10-CM

## 2024-12-04 DIAGNOSIS — F41.0 PANIC DISORDER WITHOUT AGORAPHOBIA: Chronic | ICD-10-CM

## 2024-12-04 DIAGNOSIS — F43.10 POST TRAUMATIC STRESS DISORDER (PTSD): Chronic | ICD-10-CM

## 2024-12-04 DIAGNOSIS — F51.05 INSOMNIA DUE TO MENTAL CONDITION: ICD-10-CM

## 2024-12-04 RX ORDER — PROPRANOLOL HYDROCHLORIDE 10 MG/1
10 TABLET ORAL 2 TIMES DAILY PRN
Qty: 180 TABLET | Refills: 0 | Status: SHIPPED | OUTPATIENT
Start: 2024-12-04

## 2024-12-04 RX ORDER — MIRTAZAPINE 15 MG/1
15 TABLET, FILM COATED ORAL NIGHTLY PRN
Qty: 90 TABLET | Refills: 0 | Status: SHIPPED | OUTPATIENT
Start: 2024-12-04

## 2024-12-04 RX ORDER — GABAPENTIN 300 MG/1
600 CAPSULE ORAL 3 TIMES DAILY
Qty: 90 CAPSULE | Refills: 0 | Status: SHIPPED | OUTPATIENT
Start: 2024-12-04

## 2024-12-04 RX ORDER — SERTRALINE HYDROCHLORIDE 100 MG/1
100 TABLET, FILM COATED ORAL DAILY
Qty: 90 TABLET | Refills: 0 | Status: SHIPPED | OUTPATIENT
Start: 2024-12-04

## 2024-12-04 NOTE — TELEPHONE ENCOUNTER
Called and spoke with pt to inform him of  message. Pt voiced understanding and had no further questions.         I sent in refills for gabapentin for 15 days, he takes gabapentin for back pain , he does not have diabetic neuropathy.  He needs to contact the prescriber to get more refills.  Thanks  Dr. LINDSEY

## 2024-12-04 NOTE — TELEPHONE ENCOUNTER
Takes gabapentin for back pain, refills for 15 days sent in  For further refills need to contact the prescriber      Rx Refill Note  Requested Prescriptions     Pending Prescriptions Disp Refills    gabapentin (NEURONTIN) 300 MG capsule 180 capsule 4     Sig: Take 2 capsules by mouth 3 (Three) Times a Day.      Last office visit with prescribing clinician: 6/13/2024   Last telemedicine visit with prescribing clinician: Visit date not found   Next office visit with prescribing clinician: Visit date not found                         Would you like a call back once the refill request has been completed: [] Yes [] No    If the office needs to give you a call back, can they leave a voicemail: [] Yes [] No    Nakia Toledo  12/04/24, 15:49 EST

## 2024-12-11 DIAGNOSIS — E11.65 TYPE 2 DIABETES MELLITUS WITH HYPERGLYCEMIA, WITHOUT LONG-TERM CURRENT USE OF INSULIN: Primary | ICD-10-CM

## 2024-12-11 RX ORDER — ACYCLOVIR 800 MG/1
1 TABLET ORAL
Qty: 2 EACH | Refills: 3 | Status: SHIPPED | OUTPATIENT
Start: 2024-12-11

## 2024-12-11 NOTE — TELEPHONE ENCOUNTER
Rx Refill Note  Requested Prescriptions     Pending Prescriptions Disp Refills    Continuous Glucose Sensor (FreeStyle Mike 3 Sensor) misc 2 each 3     Sig: Use 1 Sensor Every 14 (Fourteen) Days.      Last office visit with prescribing clinician: 8/2/2024   Last telemedicine visit with prescribing clinician: Visit date not found   Next office visit with prescribing clinician: Visit date not found                         Would you like a call back once the refill request has been completed: [] Yes [] No    If the office needs to give you a call back, can they leave a voicemail: [] Yes [] No    Karen Lujan MA  12/11/24, 13:57 EST

## 2024-12-16 ENCOUNTER — SPECIALTY PHARMACY (OUTPATIENT)
Dept: ENDOCRINOLOGY | Age: 44
End: 2024-12-16
Payer: COMMERCIAL

## 2024-12-16 NOTE — PROGRESS NOTES
Specialty Pharmacy Refill Coordination Note     Guilherme is a 44 y.o. male contacted today regarding refills of  7 specialty medication(s).    Reviewed and verified with patient:       Specialty medication(s) and dose(s) confirmed: yes    Refill Questions      Flowsheet Row Most Recent Value   Changes to allergies? No   Changes to medications? No   New conditions or infections since last clinic visit No   Unplanned office visit, urgent care, ED, or hospital admission in the last 4 weeks  No   How does patient/caregiver feel medication is working? Good   Financial problems or insurance changes  No   Since the previous refill, were any specialty medication doses or scheduled injections missed or delayed?  No   Does this patient require a clinical escalation to a pharmacist? No            Delivery Questions      Flowsheet Row Most Recent Value   Delivery method UPS   Delivery address verified with patient/caregiver? Yes   Delivery address Other (Comment)  [work]   Number of medications in delivery 7   Medication(s) being filled and delivered Mirtazapine (REMERON), Propranolol HCl (INDERAL), Sertraline HCl (ZOLOFT), Insulin Pen Needle, Gabapentin (NEURONTIN), Continuous Glucose Sensor (FreeStyle Mike 3 Sensor), Insulin Degludec (Tresiba FlexTouch)   Doses left of specialty medications 1 week   Copay verified? Yes   Copay amount $0.00   Copay form of payment No copayment ($0)   Ship Date 12/16   Delivery Date 12/17   Signature Required Yes                   Follow-up: 23 day(s)     Aspen Rojas, Pharmacy Technician  Specialty Pharmacy Technician

## 2025-01-08 ENCOUNTER — SPECIALTY PHARMACY (OUTPATIENT)
Dept: ENDOCRINOLOGY | Age: 45
End: 2025-01-08
Payer: COMMERCIAL

## 2025-01-13 ENCOUNTER — SPECIALTY PHARMACY (OUTPATIENT)
Dept: ENDOCRINOLOGY | Age: 45
End: 2025-01-13
Payer: COMMERCIAL

## 2025-01-13 NOTE — PROGRESS NOTES
Specialty Pharmacy Refill Coordination Note     Guilherme is a 44 y.o. male contacted today regarding refills of  5 specialty medication(s).    Reviewed and verified with patient:       Specialty medication(s) and dose(s) confirmed: yes    Refill Questions      Flowsheet Row Most Recent Value   Changes to allergies? No   Changes to medications? No   New conditions or infections since last clinic visit No   Unplanned office visit, urgent care, ED, or hospital admission in the last 4 weeks  No   How does patient/caregiver feel medication is working? Good   Financial problems or insurance changes  No   Since the previous refill, were any specialty medication doses or scheduled injections missed or delayed?  No   Does this patient require a clinical escalation to a pharmacist? No            Delivery Questions      Flowsheet Row Most Recent Value   Delivery method UPS   Delivery address verified with patient/caregiver? Yes   Delivery address Prescription   Number of medications in delivery 5   Medication(s) being filled and delivered Continuous Glucose Sensor (FreeStyle Mike 3 Sensor), Polyethylene Glycol 3350 (MIRALAX), Insulin Lispro (HUMALOG), Blood Glucose Monitoring Suppl, Glucose Blood   Doses left of specialty medications 1 week   Copay verified? Yes   Copay amount $0.00   Copay form of payment No copayment ($0)   Ship Date 1/15   Delivery Date 1/16   Signature Required Yes                   Follow-up: 23 day(s)     Aspen Rojas, Pharmacy Technician  Specialty Pharmacy Technician

## 2025-01-24 ENCOUNTER — SPECIALTY PHARMACY (OUTPATIENT)
Dept: ENDOCRINOLOGY | Age: 45
End: 2025-01-24
Payer: COMMERCIAL

## 2025-01-24 NOTE — PROGRESS NOTES
Specialty Pharmacy Refill Coordination Note     Guilherme is a 44 y.o. male contacted today regarding refills of  2 specialty medication(s).    Reviewed and verified with patient:       Specialty medication(s) and dose(s) confirmed: yes    Refill Questions      Flowsheet Row Most Recent Value   Changes to allergies? No   Changes to medications? No   New conditions or infections since last clinic visit No   Unplanned office visit, urgent care, ED, or hospital admission in the last 4 weeks  No   How does patient/caregiver feel medication is working? Good   Financial problems or insurance changes  No   Since the previous refill, were any specialty medication doses or scheduled injections missed or delayed?  No   Does this patient require a clinical escalation to a pharmacist? No            Delivery Questions      Flowsheet Row Most Recent Value   Delivery method UPS   Delivery address verified with patient/caregiver? Yes   Delivery address Prescription   Number of medications in delivery 2   Medication(s) being filled and delivered Rosuvastatin Calcium (CRESTOR), Insulin Pen Needle   Doses left of specialty medications 1 week   Copay verified? Yes   Copay amount $0.00   Copay form of payment No copayment ($0)   Ship Date 01/27/2025   Delivery Date Selection 01/28/25   Signature Required Yes                   Follow-up: 23 day(s)     Aspne Rojas, Pharmacy Technician  Specialty Pharmacy Technician

## 2025-02-17 ENCOUNTER — SPECIALTY PHARMACY (OUTPATIENT)
Dept: ENDOCRINOLOGY | Age: 45
End: 2025-02-17
Payer: COMMERCIAL

## 2025-02-17 NOTE — PROGRESS NOTES
Specialty Pharmacy Patient Management Program  Refill Outreach     Guilherme was contacted today regarding refills of their medication(s).    Refill Questions      Flowsheet Row Most Recent Value   Changes to allergies? No   Changes to medications? No   New conditions or infections since last clinic visit No   Unplanned office visit, urgent care, ED, or hospital admission in the last 4 weeks  No   How does patient/caregiver feel medication is working? Good   Financial problems or insurance changes  No   Since the previous refill, were any specialty medication doses or scheduled injections missed or delayed?  No   Does this patient require a clinical escalation to a pharmacist? No            Delivery Questions      Flowsheet Row Most Recent Value   Delivery method UPS   Delivery address verified with patient/caregiver? Yes   Delivery address Prescription   Number of medications in delivery 1   Medication(s) being filled and delivered Continuous Glucose Sensor (FreeStyle Mike 3 Sensor)   Doses left of specialty medications 1 week   Copay verified? Yes   Copay amount $0.00   Copay form of payment No copayment ($0)   Ship Date 02/20/2025   Delivery Date Selection 02/21/25   Signature Required Yes                 Follow-up: 21 day(s)     Aspen Rojas, Pharmacy Technician  2/17/2025  09:26 EST

## 2025-02-24 ENCOUNTER — SPECIALTY PHARMACY (OUTPATIENT)
Dept: ENDOCRINOLOGY | Age: 45
End: 2025-02-24
Payer: COMMERCIAL

## 2025-02-24 NOTE — PROGRESS NOTES
Specialty Pharmacy Patient Management Program  Refill Outreach     Guilherme was contacted today regarding their medication(s).    Pt states has held off on Ozmepic for now. Was dropping his sugar too low. Pt is aware he needs to schedule a follow up appointment, and needs several refills requested from Dr LINDSEY. Seems like the PA is still active but removing this outreach for now, will add back in if/when necessary.     Aspen Rojas, Pharmacy Technician  2/24/2025  09:23 EST

## 2025-03-11 ENCOUNTER — SPECIALTY PHARMACY (OUTPATIENT)
Dept: ENDOCRINOLOGY | Age: 45
End: 2025-03-11
Payer: COMMERCIAL

## 2025-03-11 RX ORDER — POLYETHYLENE GLYCOL 3350 17 G/17G
17 POWDER, FOR SOLUTION ORAL DAILY
Qty: 510 G | Refills: 1 | Status: CANCELLED | OUTPATIENT
Start: 2025-03-11

## 2025-03-11 NOTE — PROGRESS NOTES
Specialty Pharmacy Patient Management Program  Refill Outreach     Guilherme was contacted today regarding refills of their medication(s).    Refill Questions      Flowsheet Row Most Recent Value   Changes to allergies? No   Changes to medications? No   New conditions or infections since last clinic visit No   Unplanned office visit, urgent care, ED, or hospital admission in the last 4 weeks  No   How does patient/caregiver feel medication is working? Good   Financial problems or insurance changes  No   Since the previous refill, were any specialty medication doses or scheduled injections missed or delayed?  No   Does this patient require a clinical escalation to a pharmacist? No            Delivery Questions      Flowsheet Row Most Recent Value   Delivery method UPS   Delivery address verified with patient/caregiver? Yes   Delivery address Home   Number of medications in delivery 1   Medication(s) being filled and delivered Continuous Glucose Sensor (FreeStyle Mike 3 Sensor)   Doses left of specialty medications 1 week   Copay verified? Yes   Copay amount $0.00   Copay form of payment No copayment ($0)   Delivery Date Selection 03/12/25   Signature Required Yes                 Follow-up: 21 day(s)     Aspen Rojas, Pharmacy Technician  3/11/2025  09:28 EDT

## 2025-03-12 DIAGNOSIS — F43.10 POST TRAUMATIC STRESS DISORDER (PTSD): Chronic | ICD-10-CM

## 2025-03-12 DIAGNOSIS — F41.0 PANIC DISORDER WITHOUT AGORAPHOBIA: Chronic | ICD-10-CM

## 2025-03-12 DIAGNOSIS — F51.05 INSOMNIA DUE TO MENTAL CONDITION: ICD-10-CM

## 2025-03-12 RX ORDER — MIRTAZAPINE 15 MG/1
15 TABLET, FILM COATED ORAL NIGHTLY PRN
Qty: 90 TABLET | Refills: 0 | Status: CANCELLED | OUTPATIENT
Start: 2025-03-12

## 2025-03-12 RX ORDER — PROPRANOLOL HYDROCHLORIDE 10 MG/1
10 TABLET ORAL 2 TIMES DAILY PRN
Qty: 180 TABLET | Refills: 0 | Status: CANCELLED | OUTPATIENT
Start: 2025-03-12

## 2025-03-12 RX ORDER — SERTRALINE HYDROCHLORIDE 100 MG/1
100 TABLET, FILM COATED ORAL DAILY
Qty: 90 TABLET | Refills: 0 | Status: CANCELLED | OUTPATIENT
Start: 2025-03-12

## 2025-03-12 NOTE — TELEPHONE ENCOUNTER
Called and spoke with the patient. He stayed he did not request for the pharmacy to send over for refills. Patient stated he is dealing with some personal things at the moment and will call the office back to schedule follow up for a later date and then at that time will request the refills.

## 2025-04-03 ENCOUNTER — SPECIALTY PHARMACY (OUTPATIENT)
Dept: ENDOCRINOLOGY | Age: 45
End: 2025-04-03
Payer: COMMERCIAL

## 2025-04-03 DIAGNOSIS — E11.65 TYPE 2 DIABETES MELLITUS WITH HYPERGLYCEMIA, WITHOUT LONG-TERM CURRENT USE OF INSULIN: ICD-10-CM

## 2025-04-03 NOTE — TELEPHONE ENCOUNTER
Specialty Pharmacy Patient Management Program  Prescription Refill Request     Patient currently fills medications at  Pharmacy. Needing refill(s) on the following:      Requested Prescriptions     Pending Prescriptions Disp Refills    Continuous Glucose Sensor (FreeStyle Mike 3 Sensor) misc 2 each 0     Sig: Use 1 Sensor Every 14 (Fourteen) Days.    Insulin Pen Needle (Pen Needles) 31G X 5 MM misc 200 each 0     Sig: Use 1 each 4 (Four) Times a Day.       Last visit: 08/02/24 12/13/24 - Appt scheduled with Dr. ROSALIA Rush (CANCEL VIA INTERFACE)  Next visit: No follow-up scheduled at this time     Pended for Dr. Nokhbehzaeim to review, and approve if appropriate.       Taylor Dennison, PharmD, BCACP, BC-ADM, CDCES  Clinical Specialty Pharmacist, Endocrinology  4/3/2025  08:36 EDT

## 2025-04-03 NOTE — PROGRESS NOTES
Specialty Pharmacy Patient Management Program  Program Disenrollment      Guilherme Garcia is a 45 y.o. male seen by an Endocrinology provider for Type 2 Diabetes. Patient was previously enrolled in the Endocrinology Patient Management program offered by [facility].      Patient disenrolled due to transfer of care. According to Dr. LINDSEY, patient so no longer following with her and will be going to his PCP for diabetes management.     It has been a pleasure taking part in this patients care and we would be happy to enroll them into the speciality pharmacy program again in the future.       Tayolr Dennison, PharmD, BCACP, BC-ADM, [facility]  Clinical Specialty Pharmacist, Endocrinology  4/3/2025  14:28 EDT

## 2025-04-04 RX ORDER — ACYCLOVIR 800 MG/1
1 TABLET ORAL
Qty: 2 EACH | Refills: 0 | OUTPATIENT
Start: 2025-04-04

## 2025-04-04 RX ORDER — PEN NEEDLE, DIABETIC 30 GX3/16"
1 NEEDLE, DISPOSABLE MISCELLANEOUS 4 TIMES DAILY
Qty: 200 EACH | Refills: 0 | OUTPATIENT
Start: 2025-04-04

## 2025-07-07 DIAGNOSIS — Z79.4 TYPE 2 DIABETES MELLITUS WITH HYPERGLYCEMIA, WITH LONG-TERM CURRENT USE OF INSULIN: ICD-10-CM

## 2025-07-07 DIAGNOSIS — E11.65 TYPE 2 DIABETES MELLITUS WITH HYPERGLYCEMIA, WITHOUT LONG-TERM CURRENT USE OF INSULIN: ICD-10-CM

## 2025-07-07 DIAGNOSIS — E11.65 TYPE 2 DIABETES MELLITUS WITH HYPERGLYCEMIA, WITH LONG-TERM CURRENT USE OF INSULIN: ICD-10-CM

## 2025-07-07 DIAGNOSIS — E11.65 UNCONTROLLED TYPE 2 DIABETES MELLITUS WITH HYPERGLYCEMIA: ICD-10-CM

## 2025-07-07 RX ORDER — GABAPENTIN 300 MG/1
600 CAPSULE ORAL 3 TIMES DAILY
Qty: 90 CAPSULE | Refills: 0 | Status: CANCELLED | OUTPATIENT
Start: 2025-07-07

## 2025-07-07 RX ORDER — INSULIN DEGLUDEC 100 U/ML
30 INJECTION, SOLUTION SUBCUTANEOUS DAILY
Qty: 15 ML | Refills: 0 | Status: SHIPPED | OUTPATIENT
Start: 2025-07-07 | End: 2025-07-08 | Stop reason: SDUPTHER

## 2025-07-07 RX ORDER — BLOOD SUGAR DIAGNOSTIC
1 STRIP MISCELLANEOUS 4 TIMES DAILY
Qty: 100 EACH | Refills: 0 | Status: SHIPPED | OUTPATIENT
Start: 2025-07-07 | End: 2025-07-08 | Stop reason: SDUPTHER

## 2025-07-07 RX ORDER — AVOBENZONE, HOMOSALATE, OCTISALATE, OCTOCRYLENE 30; 40; 45; 26 MG/ML; MG/ML; MG/ML; MG/ML
1 CREAM TOPICAL 4 TIMES DAILY
Qty: 100 EACH | Refills: 0 | Status: SHIPPED | OUTPATIENT
Start: 2025-07-07

## 2025-07-07 RX ORDER — BLOOD-GLUCOSE METER
1 KIT MISCELLANEOUS DAILY
Qty: 1 KIT | Refills: 0 | Status: SHIPPED | OUTPATIENT
Start: 2025-07-07

## 2025-07-07 RX ORDER — ACYCLOVIR 800 MG/1
1 TABLET ORAL
Qty: 4 EACH | Refills: 0 | Status: SHIPPED | OUTPATIENT
Start: 2025-07-07

## 2025-07-07 RX ORDER — INSULIN LISPRO 100 [IU]/ML
8 INJECTION, SOLUTION INTRAVENOUS; SUBCUTANEOUS 3 TIMES DAILY
Qty: 15 ML | Refills: 0 | Status: SHIPPED | OUTPATIENT
Start: 2025-07-07

## 2025-07-07 RX ORDER — PEN NEEDLE, DIABETIC 30 GX3/16"
1 NEEDLE, DISPOSABLE MISCELLANEOUS 4 TIMES DAILY
Qty: 200 EACH | Refills: 0 | Status: SHIPPED | OUTPATIENT
Start: 2025-07-07

## 2025-07-07 NOTE — TELEPHONE ENCOUNTER
Rx Refill Note  Requested Prescriptions     Pending Prescriptions Disp Refills    glucose blood (OneTouch Verio) test strip 100 each 3     Sig: Use 1 test strip 4 (Four) Times a Day. One touch verio    Lancets misc 100 each 3     Sig: Use 1 lancet 4 (Four) Times a Day.    insulin degludec (Tresiba FlexTouch) 100 UNIT/ML solution pen-injector injection 30 mL 0     Sig: Inject 30 Units under the skin into the appropriate area as directed Daily.    Insulin Lispro, 1 Unit Dial, (HumaLOG KwikPen) 100 UNIT/ML solution pen-injector 30 mL 0     Sig: Inject 8 Units under the skin into the appropriate area as directed 3 times a day.    Insulin Pen Needle (Pen Needles) 31G X 5 MM misc 200 each 2     Sig: Use 1 each 4 (Four) Times a Day.    Continuous Glucose Sensor (FreeStyle Mike 3 Sensor) misc 2 each 3     Sig: Use 1 Sensor Every 14 (Fourteen) Days.      Last office visit with prescribing clinician: 8/2/2024   Last telemedicine visit with prescribing clinician: Visit date not found   Next office visit with prescribing clinician: 8/28/2025                         Would you like a call back once the refill request has been completed: [] Yes [] No    If the office needs to give you a call back, can they leave a voicemail: [] Yes [] No    Nakia Toledo  07/07/25, 09:52 EDT  Nakia Toledo  7/7/2025  09:52 EDT

## 2025-07-08 ENCOUNTER — TREATMENT (OUTPATIENT)
Dept: ENDOCRINOLOGY | Age: 45
End: 2025-07-08
Payer: COMMERCIAL

## 2025-07-08 ENCOUNTER — PRIOR AUTHORIZATION (OUTPATIENT)
Dept: ENDOCRINOLOGY | Age: 45
End: 2025-07-08
Payer: COMMERCIAL

## 2025-07-08 DIAGNOSIS — E11.65 TYPE 2 DIABETES MELLITUS WITH HYPERGLYCEMIA, WITH LONG-TERM CURRENT USE OF INSULIN: ICD-10-CM

## 2025-07-08 DIAGNOSIS — E11.65 TYPE 2 DIABETES MELLITUS WITH HYPERGLYCEMIA, WITHOUT LONG-TERM CURRENT USE OF INSULIN: Primary | ICD-10-CM

## 2025-07-08 DIAGNOSIS — Z79.4 TYPE 2 DIABETES MELLITUS WITH HYPERGLYCEMIA, WITH LONG-TERM CURRENT USE OF INSULIN: ICD-10-CM

## 2025-07-08 RX ORDER — BLOOD-GLUCOSE METER
1 KIT MISCELLANEOUS 4 TIMES DAILY
Qty: 1 EACH | Refills: 0 | Status: SHIPPED | OUTPATIENT
Start: 2025-07-08

## 2025-07-08 NOTE — TELEPHONE ENCOUNTER
Endocrinology  Prior Authorization      Prior Authorization for Ozempic 0.25ml/0.5MG was Submitted.    Key: [id number]    The request has been approved. The authorization is effective from 07/08/2025 to 01/07/2026, as long as the member is enrolled in their current health plan. The request was approved as submitted. A written notification letter will follow with additional details.. Authorization Expiration Date: January 7, 2026.    Pa Approved from: 7/8/2025  Pa ending on: 1/7/2026    Karen Lujan CMA

## 2025-07-08 NOTE — TELEPHONE ENCOUNTER
Rx Refill Note  Requested Prescriptions     Pending Prescriptions Disp Refills    gabapentin (NEURONTIN) 300 MG capsule 90 capsule 0     Sig: Take 2 capsules by mouth 3 (Three) Times a Day.      Last office visit with prescribing clinician: 8/2/2024   Last telemedicine visit with prescribing clinician: Visit date not found   Next office visit with prescribing clinician: 8/28/2025                         Would you like a call back once the refill request has been completed: [] Yes [] No    If the office needs to give you a call back, can they leave a voicemail: [] Yes [] No    Nakia Toledo  07/08/25, 07:48 EDT  Nakia Toledo  7/8/2025  07:48 EDT

## 2025-07-09 ENCOUNTER — TELEPHONE (OUTPATIENT)
Dept: ENDOCRINOLOGY | Age: 45
End: 2025-07-09
Payer: COMMERCIAL

## 2025-07-21 ENCOUNTER — TELEPHONE (OUTPATIENT)
Dept: ENDOCRINOLOGY | Age: 45
End: 2025-07-21

## 2025-07-22 ENCOUNTER — SPECIALTY PHARMACY (OUTPATIENT)
Dept: ENDOCRINOLOGY | Age: 45
End: 2025-07-22
Payer: COMMERCIAL

## 2025-07-22 ENCOUNTER — TELEMEDICINE (OUTPATIENT)
Dept: ENDOCRINOLOGY | Age: 45
End: 2025-07-22
Payer: COMMERCIAL

## 2025-07-22 DIAGNOSIS — E11.65 TYPE 2 DIABETES MELLITUS WITH HYPERGLYCEMIA, WITH LONG-TERM CURRENT USE OF INSULIN: Primary | ICD-10-CM

## 2025-07-22 DIAGNOSIS — Z79.4 TYPE 2 DIABETES MELLITUS WITH HYPERGLYCEMIA, WITH LONG-TERM CURRENT USE OF INSULIN: Primary | ICD-10-CM

## 2025-07-22 DIAGNOSIS — E27.8 LEFT ADRENAL MASS: ICD-10-CM

## 2025-07-22 DIAGNOSIS — E78.5 HYPERLIPIDEMIA ASSOCIATED WITH TYPE 2 DIABETES MELLITUS: ICD-10-CM

## 2025-07-22 DIAGNOSIS — E11.69 HYPERLIPIDEMIA ASSOCIATED WITH TYPE 2 DIABETES MELLITUS: ICD-10-CM

## 2025-07-22 PROCEDURE — 95251 CONT GLUC MNTR ANALYSIS I&R: CPT | Performed by: STUDENT IN AN ORGANIZED HEALTH CARE EDUCATION/TRAINING PROGRAM

## 2025-07-22 PROCEDURE — 99214 OFFICE O/P EST MOD 30 MIN: CPT | Performed by: STUDENT IN AN ORGANIZED HEALTH CARE EDUCATION/TRAINING PROGRAM

## 2025-07-22 RX ORDER — SEMAGLUTIDE 0.68 MG/ML
0.25 INJECTION, SOLUTION SUBCUTANEOUS WEEKLY
Qty: 3 ML | Refills: 1 | Status: SHIPPED | OUTPATIENT
Start: 2025-07-22

## 2025-07-22 RX ORDER — DEXAMETHASONE 1 MG
1 TABLET ORAL ONCE
Qty: 1 TABLET | Refills: 0 | Status: SHIPPED | OUTPATIENT
Start: 2025-07-22 | End: 2025-07-23

## 2025-07-22 NOTE — ASSESSMENT & PLAN NOTE
Diabetes is uncontrolled.   Continue current treatment regimen.  Reminded to bring in blood sugar diary at next visit.  Recommended an ADA diet.  Recommended a Mediterranean style of eating  Regular aerobic exercise.  Discussed ways to avoid symptomatic hypoglycemia.  Discussed sick day management.  Discussed foot care.  Reminded to get yearly retinal exam.  Continue Lantus 20 units daily, if prebreakfast blood sugar less than 80 on 2 consecutive days decrease the dose by 2 units  Take Humalog 10 units, 15 minutes before meals  Start taking Ozempic.  Ozempic 0.25 mg weekly for 4 weeks then increase the dose to 0.5 mg weekly  Emphasized on the importance of medication compliance  Diabetes will be reassessed 4 months

## 2025-07-22 NOTE — PROGRESS NOTES
Specialty Pharmacy Patient Management Program  Endocrinology Initial Assessment     Guilherme Garcia was referred by an Endocrinology provider to the Endocrinology Patient Management program offered by [facility] for Type 2 Diabetes on 25.  An initial outreach was conducted, including assessment of therapy appropriateness and specialty medication education for Humalog, Cgm, Insulin Glargine and Ozempic. The patient was introduced to services offered by [facility], including: regular assessments, refill coordination, curbside pick-up or mail order delivery options, prior authorization maintenance, and financial assistance programs as applicable. The patient was also provided with contact information for the pharmacy team.     Insurance Coverage & Financial Support  Medicaid    Relevant Past Medical History and Comorbidities  Relevant medical history and concomitant health conditions were discussed with the patient. The patient's chart has been reviewed for relevant past medical history and comorbid conditions and updated as necessary.  Past Medical History:   Diagnosis Date    Bleeding in mouth     WHEN WAKES UP IN THE AM    Diabetes mellitus     Dry throat     AND IRRITATED WHEN WAKE UP MORNING    GERD (gastroesophageal reflux disease)     Hepatitis B     TOOK MEDS    History of kidney stones     Hyperlipidemia associated with type 2 diabetes mellitus 2025     Social History     Socioeconomic History    Marital status:    Tobacco Use    Smoking status: Former     Current packs/day: 0.00     Average packs/day: 1 pack/day for 27.0 years (27.0 ttl pk-yrs)     Types: Cigarettes     Start date: 1991     Quit date: 2018     Years since quittin.5    Smokeless tobacco: Never   Vaping Use    Vaping status: Every Day    Substances: Nicotine    Devices: Refillable tank   Substance and Sexual Activity    Alcohol use: Not Currently    Drug use: Not  Currently     Comment: hx of narcotic pain medication SOBER SINCE 11/2022    Sexual activity: Yes     Partners: Female     Comment: Wife       Problem list reviewed by Shauna Botello PharmD on 7/22/2025 at  9:20 AM    Allergies  Known allergies and reactions were discussed with the patient. The patient's chart has been reviewed for  allergy information and updated as necessary.   Allergies   Allergen Reactions    Metformin Diarrhea and Nausea And Vomiting       Allergies reviewed by Shauna Botello PharmD on 7/22/2025 at  9:20 AM    Relevant Laboratory Values  Relevant laboratory values were discussed with the patient. The following specialty medication dose adjustment(s) are recommended: n/a  A1C Last 3 Results          8/2/2024    09:40   HGBA1C Last 3 Results   Hemoglobin A1C 7.80      Lab Results   Component Value Date    HGBA1C 7.80 (H) 08/02/2024     Lab Results   Component Value Date    GLUCOSE 208 (H) 08/02/2024    CALCIUM 10.0 08/02/2024     08/02/2024    K 4.8 08/02/2024    CO2 22.7 08/02/2024     08/02/2024    BUN 16 08/02/2024    CREATININE 0.97 08/02/2024    BCR 16.5 08/02/2024    ANIONGAP 14.3 08/02/2024     Lab Results   Component Value Date    CHOL 205 (H) 08/02/2024    CHLPL 209 (H) 02/12/2024    TRIG 324 (H) 08/02/2024    HDL 26 (L) 08/02/2024     (H) 08/02/2024     Microalbumin          8/2/2024    09:40   Microalbumin   Microalbumin, Urine <1.2        Current Medication List  This medication list has been reviewed with the patient and evaluated for any interactions or necessary modifications/recommendations, and updated to include all prescription medications, OTC medications, and supplements the patient is currently taking.  This list reflects what is contained in the patient's profile, which has also been marked as reviewed to communicate to other providers it is the most up to date version of the patient's current medication therapy.     Current Outpatient Medications:      Blood Glucose Monitoring Suppl (OneTouch Verio Reflect) w/Device kit, Use to test blood glucose Daily., Disp: 1 kit, Rfl: 0    Continuous Glucose Sensor (FreeStyle Mike 3 Sensor) misc, Use 1 Sensor Every 14 (Fourteen) Days., Disp: 4 each, Rfl: 0    dexAMETHasone (DECADRON) 1 MG tablet, Take 1 tablet by mouth 1 (One) Time for 1 dose. Take at bedtime the day before getting lab work, Disp: 1 tablet, Rfl: 0    docusate sodium (COLACE) 50 MG capsule, Take 1 capsule by mouth 2 (Two) Times a Day As Needed., Disp: , Rfl:     gabapentin (NEURONTIN) 300 MG capsule, Take 2 capsules by mouth 3 (Three) Times a Day., Disp: 90 capsule, Rfl: 0    glucose blood test strip, Use one test strip 4 (Four) Times a Day., Disp: 120 each, Rfl: 0    Blood Glucose Monitoring Suppl (FreeStyle Freedom Lite) w/Device kit, Use 4 (Four) Times a Day., Disp: 1 each, Rfl: 0    hydrOXYzine (ATARAX) 25 MG tablet, Take 1 tablet by mouth 3 (Three) Times a Day As Needed for Anxiety., Disp: 90 tablet, Rfl: 0    Insulin Glargine (LANTUS SOLOSTAR) 100 UNIT/ML injection pen, Inject 30 Units under the skin into the appropriate area as directed Daily., Disp: 15 mL, Rfl: 0    Insulin Lispro, 1 Unit Dial, (HumaLOG KwikPen) 100 UNIT/ML solution pen-injector, Inject 8 Units under the skin into the appropriate area as directed 3 times a day., Disp: 15 mL, Rfl: 0    Insulin Pen Needle (Pen Needles) 31G X 5 MM misc, Use 1 pen needle 4 (Four) Times a Day., Disp: 200 each, Rfl: 0    Lancets misc, Use 1 lancet 4 (Four) Times a Day., Disp: 100 each, Rfl: 0    METHADONE HCL PO, Take 1 tablet by mouth Daily. FOLLOW INSTRUCTIONS PER PRESCRIBER, Disp: , Rfl:     mirtazapine (Remeron) 15 MG tablet, Take 1 tablet by mouth At Night As Needed for sleep., Disp: 90 tablet, Rfl: 0    pantoprazole (PROTONIX) 40 MG EC tablet, Take 1 tablet by mouth Daily., Disp: 90 tablet, Rfl: 3    polyethylene glycol (ClearLax) 17 GM/SCOOP powder, Take 17 g by mouth Daily., Disp: 510 g, Rfl: 1     propranolol (INDERAL) 10 MG tablet, Take 1 tablet by mouth 2 (Two) Times a Day As Needed for anxiety., Disp: 180 tablet, Rfl: 0    rosuvastatin (Crestor) 5 MG tablet, Take 1 tablet by mouth Every Night., Disp: 90 tablet, Rfl: 3    Semaglutide,0.25 or 0.5MG/DOS, (Ozempic, 0.25 or 0.5 MG/DOSE,) 2 MG/3ML solution pen-injector, Inject 0.25 mg under the skin into the appropriate area as directed 1 (One) Time Per Week. Take 0.25 mg weekly for 4 weeks then increase the dose to 0.5 mg weekly., Disp: 3 mL, Rfl: 1    sertraline (Zoloft) 100 MG tablet, Take 1 tablet by mouth Daily., Disp: 90 tablet, Rfl: 0    Medicines reviewed by Shauna Botello, PharmD on 7/22/2025 at  9:20 AM    Drug Interactions  none  Recommended Medications Assessment  Aspirin: Not Taking Currently  Statin: Currently Taking   ACEi/ARB: Not Taking Currently    Initial Education Provided for Specialty Medication  The patient has been provided with the following education and any applicable administration techniques (i.e. self-injection) have been demonstrated for the therapies indicated. All questions and concerns have been addressed prior to the patient receiving the medication, and the patient has verbalized comprehension of the education and any materials provided. Additional patient education shall be provided and documented upon request by the patient, provider, or payer.    insulin glargine  Medication Expectations    Why am I taking this medication?  You are taking this medication to lower blood sugar and A1c because you have type 1 or type 2 diabetes. Diabetes is not curable but with proper medication and treatment, we can keep your blood sugar within your personalized target range.    What should I expect while on this medication?  You should expect to see your blood sugar and A1c decrease over time.    How does the medication work?  Insulin glargine (Basaglar, Lantus, Semglee, etc.) is a long-acting insulin that releases slowly and continuously in  the body. Insulin helps the sugar in your blood get into cells and provide them with energy to fuel your body.     How long will I be on this medication for?  If you have Type 1 diabetes, you will be on this medication or another insulin throughout your lifetime because your body cannot make its own insulin. If you have Type 2 diabetes, the amount of time you will be on this medication will be determined by your doctor based on blood sugar and A1c control. You will most likely be on this medication or another diabetes medication throughout your lifetime because your body does not make enough insulin. Do not abruptly stop this medication without talking to your doctor first.     How do I take this medication?  Take as directed on your prescription label. Insulin glargine is usually given once or twice daily and can be taken with or without food. Insulin glargine  is injected under the skin (subcutaneously) of your stomach, thigh, buttocks, or upper arm. Use a different injection site in the same body region each day.     What are some possible side effects?  Insulin glargine  may cause low blood sugar (hypoglycemia). Signs and symptoms that may indicate hypoglycemia include dizziness, sweating, anxiety, irritability, confusion, or headache.    What happens if I miss a dose?  If you miss a dose, take it as soon as you remember. If it is close to your next dose, skip it. Never take 2 doses at once.       Medication Safety    What are things I should warn my doctor immediately about?  Tell your doctor if you have kidney or liver problems. Talk to your doctor if you are pregnant, planning to become pregnant, or breastfeeding. Also tell your doctor if you notice any signs/symptoms of an allergic reaction (rash, hives, difficulty breathing, etc.).    What are things that I should be cautious of?  Be cautious of any side effects from this medication. Talk to your doctor if any new ones develop or aren't getting better.     What are some medications that can interact with this one?  Do not take this medication with rosiglitazone or macimorelin. Taking insulin glargine with other medications that lower your blood sugar may increase the risk of hypoglycemia. Your doctor may reduce the dose of these medications when you start insulin glargine  Always tell your doctor or pharmacist immediately if you start taking any new medications, including over-the-counter medications, vitamins, and herbal supplements.        Medication Storage/Handling    How should I handle this medication?  Keep this medication out of reach of pets/children and keep the pen capped when not in use. Do not share your medicine with others.     How does this medication need to be stored?  Store your new, unused pens in the original carton in the refrigerator. Protect it from light. Do not freeze. Always remove the needle and place the cap on the pen before storing.     How should I dispose of this medication?  Used insulin glargine  pens should be thrown away after 28 days even if insulin remains.?Place your used pen and needle in an approved sharps container?If your doctor decides to stop this medication, take to your local police station for proper disposal. Some pharmacies also have?take-back bins for medication drop-off.??       Resources/Support    How can I remind myself to take this medication?  You can download reminder apps to help you manage your refills or set an alarm on your phone to remind you.     Is financial support available?   Manufacturers of insulin glargine  can provide co-pay cards if you have commercial insurance or patient assistance if you have Medicare or no insurance.     Which vaccines are recommended for me?  Talk to your doctor about these vaccines: Flu, Coronavirus (COVID-19), Pneumococcal (pneumonia), Tdap, Hepatitis B, Zoster (shingles)        Ozempic® (semaglutide)  Medication Expectations   Why am I taking this medication? You are  taking Ozempic, along with diet and exercise, to lower blood sugar because you have type 2 diabetes. You may also be taking Ozempic if you have diabetes and cardiovascular disease to reduce your risk of heart attack or stroke.    What should I expect while on this medication? You should expect to see your blood sugar, A1c and possibly weight decrease over time.   How does the medication work? Ozempic is a non-insulin injection that works with your body to release insulin in response to your blood sugar rising.  This medication also slows down food from leaving your stomach, making you feel velazquez for longer.   How long will I be on this medication for? The amount of time you will be on this medication will be determined by your doctor based on blood sugar and A1c control. You will most likely be on this medication or another diabetes medication throughout your lifetime. Do not abruptly stop this medication without talking to your doctor first.    How do I take this medication? Take as directed on your prescription label. Ozempic is injected under the skin (subcutaneously) of your stomach, thigh or upper arm.  Use this medication once weekly, on the same day each week, and it can be given with or without food.    What are some possible side effects? You may notice you don't feel as hungry, especially when you first start using Ozempic.  The most common side effects are nausea, stomach cramping, and constipation. Stop using Ozempic and call your doctor immediately if you have severe pain in your stomach area that will not go away.    What happens if I miss a dose? If you miss a dose, take it as soon as you remember as long as it is within 5 days after your missed dose.  If more than 5 days have passed, skip the missed dose and resume Ozempic on the regularly scheduled day.     Medication Safety   What are things I should warn my doctor immediately about? Do not use Ozempic if you or a family member have ever had  medullary thyroid cancer (MTC) or Multiple Endocrine Neoplasia syndrome type 2 (MEN 2).  Tell your doctor if you have or have had problems with your kidneys or pancreas, or if you are pregnant, planning to become pregnant. Stop using Ozempic and get medical help right away if you have severe pain that will not go away, or if you notice any signs/symptoms of an allergic reaction (rash, hives, difficulty breathing, etc.).    What are things that I should be cautious of? Be cautious of any side effects from this medication. Talk to your doctor if any new ones develop or aren't getting better.    What are some medications that can interact with this one? Taking Ozempic with other medications that also lower your blood sugar such as insulin and glipizide/glimepiride/glyburide may increase the risk of low blood sugar. Your doctor may reduce the dose of these medications when you start Ozempic.  Always tell your doctor or pharmacist immediately if you start taking any new medications, including over-the-counter medications, vitamins, and herbal supplements.       Medication Storage/Handling   How should I handle this medication? Keep this medication out of reach of pets/children and keep the pen capped    How does this medication need to be stored? Store in the refrigerator prior to first use, but do not freeze.  After first use, you may continue to store in the refrigerator or at room temperature for 56 days.  Protect from excessive heat and sunlight.   How should I dispose of this medication? Used Ozempic pens should be thrown away after 56 days, even if medication remains. If your doctor decides to stop this medication, take to your local police station for proper disposal. Some pharmacies also have take-back bins for medication drop-off.      Resources/Support   How can I remind myself to take this medication? You can download reminder apps to help you manage your refills. You may also set an alarm on your phone to  remind you.    Is financial support available?  [employer] can provide co-pay cards if you have commercial insurance or patient assistance if you have Medicare or no insurance.    Which vaccines are recommended for me? Talk to your doctor about these vaccines: Flu, Coronavirus (COVID-19), Pneumococcal (pneumonia), Tdap, Hepatitis B, Zoster (shingles)      BRAND NAME®  FreeStyle Mike 3 Continuous Glucose Monitoring (CGM)  Medication Expectations   Why am I using this device? You are using this device to continuously monitor your blood sugar.    What should I expect while I am using this device? You should expect the sensor system to send your blood sugar readings to your phone or  every 5 minutes. It also sends alerts when your blood sugar is too high or too low. If you experience symptoms that do not match your reading or if there are issues with the sensor you will still need to use your meter.    How does the device work? This device is composed of a sensor and /phone. The sensor is inserted under the skin to check your serum glucose levels and trends. The sensor sends signals directly to the .  You will act on readings that appear with both a number and a trend arrow, otherwise use your blood sugar monitor to verify results before taking action. Do not mix component from other generations.    How long will I be on this medication for?   The amount of time you will be on this device will be determined by your doctor based on your blood sugar control and comfort using the device.        How often are the pieces changed? The sensor is removed and replaced every 14 days. Make sure to rotate sites. Avoid areas with loose skin or without enough fat to avoid muscles and bone. Do not apply to areas that get bumped, pushed or you lie on while sleeping. Do not use near waistbands, on areas that are scarred or have lots of hair.    What are some possible side effects or concerns with this device?  If you have problems with the patches starting to peel around the edges, you can use medical tape to secure. There are also adhesives available made specifically to fit the sensor and secure them to the skin. Do not tape over the transmitter or any plastic parts. It is possible to get an itchy, red rash while using the sensor. Reach out to your provider or pharmacist for options if it is bothersome.    What are the benefits of Continuous Glucose Monitoring? You are able to share results using the phone radha. You can monitor trends such as if you blood sugar is rising or falling or what your levels have been over the past 24 hours. You get a realtime reading of current blood sugar levels. You get alerts when your sugar is higher than 250mg/dL or if it falls below 70mg/dL.      Medication Safety   What are things I should warn my doctor immediately about?   Talk to your doctor right away if you show any signs of an infection at the sensor site, such as pus, pain or redness. Stop placement and tell your doctor immediately or seek emergency medical help if you notice any signs of an allergic reaction (sever rash, redness, hives, severe itching, trouble breathing, or swelling of the face, lips, or tongue).       What are things that I should be cautious of? The CGM readings and the meter values may not be the same and that's ok. The 2 different devices use to different types of fluid. The CGM uses interstitial fluid and the meter uses blood. They both have a range in which they are consider accurate. Readings can be different and still fall into their accurate range.This device should not be worn during certain medical procedures. Make your provider aware if you are wearing a device. Do not use within 3 inches of an infusion or injection site.    What are some medications that can interact with this device? Some medications that may interact include vitamin C, hydroxyurea and acetaminophen nay impact your readings. Always  tell your doctor or pharmacist immediately if you start taking any new medications, including over-the-counter medications, vitamins, and herbal supplements.      Medication Storage/Handling   How should I handle this device?   Keep this medication out of reach of pets/children. Wash hands well before and after applying or changing a sensor.  Do not get sunscreen or insect repellant on the sensor. Keep your  close to you to prevent unauthorized access to your personal information. Do not use if damaged.     How does this medication need to be stored? Do not open sensors until you are ready to apply. Do not unscrew applicator cap until ready to insert sensor. Do not expose system to extreme heat or direct sunlight   How should I dispose of this medication? Please follow local regional requirements for disposal of electronics, this includes both the sensor and the . The sensors and applicators have come in contact with bodily fluid please dispose of accordingly, preferably using a sharps container. Before disposing of the applicator, screw the cap back on.      Resources/Support   How can I remind myself to use this device? You can download the phone radha for reminders. The phone radha/ reminds you to change the different pieces of the system as needed.    Is financial support available?  The  can provide co-pay cards if you have commercial insurance or patient assistance if you have Medicare or no insurance.    What resources are available for support? Mike Support: For troubleshooting tips, tap Help (on the device) for more information or see https://www.[employer].abbott/us-en/support/contact-us.html. Any errors that continue beyond 3 hours, contact [employer] technical support: Toll Free: 1-739.567.8342 (7 days a week, 8am-8pm ET)      Enter Specific Medication SmartPhrase [.urac(medication name)] Here    Adherence and Self-Administration  Adherence related to the patient's specialty  therapy was discussed with the patient. The Adherence segment of this outreach has been reviewed and updated.     Is there a concern with patient's ability to self administer the medication correctly and without issue?: No  Were any potential barriers to adherence identified during the initial assessment or patient education?: No  Are there any concerns regarding the patient's understanding of the importance of medication adherence?: No  Methods for Supporting Patient Adherence and/or Self-Administration: none    Open Medication Therapy Problems  No medication therapy recommendations to display    Goals of Therapy  Goals related to the patient's specialty therapy were discussed with the patient. The Patient Goals segment of this outreach has been reviewed and updated.   Goals Addressed Today        Specialty Pharmacy General Goal      A1c < 7%    Lab Results   Component Value Date    HGBA1C 7.80 (H) 08/02/2024    HGBA1C 12.7 (H) 02/12/2024    HGBA1C 8.3 (H) 09/15/2018    HGBA1C 9.1 (H) 09/10/2018    HGBA1C 9.6 (H) 09/09/2018 7/22/25: on track, starting ozempic 0.25 mg weekly today for 4 weeks, then increase to 0.5 mg weekly if able to tolerate.            Reassessment Plan & Follow-Up  1. Medication Therapy Changes: Start ozempic 0.25 mg subq weekly for 4 weeks, then increase to 0.5 mg weekly if able to tolerate. Continue Lantus 20 units daily, if prebreakfast blood sugar less than 80 on 2 consecutive days decrease the dose by 2 units. Continue humalog 10 units TID with meals.  2. Related Plans, Therapy Recommendations, or Therapy Problems to Be Addressed: no issues  3. Pharmacist to perform regular assessments no more than (6) months from the previous assessment.  4. Care Coordinator to set up future refill outreaches, coordinate prescription delivery, and escalate clinical questions to pharmacist.  5. Welcome information and patient satisfaction survey to be sent by specialty pharmacy team with patient's  initial fill.     Specialty Pharmacy Patient Management Program  Endocrinology Refill Outreach      Guilherme is a 45 y.o. male contacted today regarding refills of his medication(s).    Specialty medication(s) and dose(s) confirmed: ozempic      Delivery Questions      Flowsheet Row Most Recent Value   Delivery method UPS   Delivery address verified with patient/caregiver? Yes   Delivery address Home   Number of medications in delivery 2   Medication(s) being filled and delivered dexAMETHasone (DECADRON), Semaglutide (Ozempic (0.25 or 0.5 MG/DOSE))   Copay verified? Yes   Copay amount 0   Copay form of payment No copayment ($0)   Delivery Date Selection 07/23/25   Signature Required Yes            Follow-Up: 42 days      Attestation  Therapeutic appropriateness: Appropriate   I attest the patient was actively involved in and has agreed to the above plan of care. If the prescribed therapy is at any point deemed not appropriate based on the current or future assessments, a consultation will be initiated with the patient's specialty care provider to determine the best course of action. The revised plan of therapy will be documented along with any required assessments and/or additional patient education provided.     Shauna Botello, PharmD  Clinical Specialty Pharmacist, Endocrinology  7/22/2025  09:22 EDT    Discussed the aforementioned information with the patient via Telephone.

## 2025-07-22 NOTE — PROGRESS NOTES
Chief Complaint  Diabetes     Subjective        Guilherme Garcia presents to [facility] ENDOCRINOLOGY  History of Present Illness    Last OV 8/2024     You have chosen to receive care through a telehealth visit.  Do you consent to use a video/audio connection for your medical care today? Yes    Diagnosed with type 2 diabetes in 2018   Metformin dc'd due to GI side effects       Used insulin PRN for a while d/t insurance     Current medications :  Lantus  20 units daily  Humalog 10 units with meals     There is no personal or family history of pancreatitis, pancreatic cancer or thyroid cancer     Freestyle downloaded and reviewed  Target range 27%  High 52%  Very high 21%  Average glucose 213  GMI 8.4%  No low BG      Started taking Crestor 5 mg qhs    Takes Neurontin for pain  ( no diabetic neuropathy)      Adrenal mass  CT adrenal: unenhanced 0.3 hounsfield units. Attenuation less than 10 on unenhanced is good. Probably an adrenal adenoma.   Hormonal workup unremarkable     Component      Latest Ref Rng 5/2/2024 5/3/2024 8/2/2024   Glucose      65 - 99 mg/dL 137 (H)   208 (H)    BUN      6 - 20 mg/dL 18   16    Creatinine      0.76 - 1.27 mg/dL 0.84   0.97    Sodium      136 - 145 mmol/L 139   137    Potassium      3.5 - 5.2 mmol/L 5.0   4.8    Chloride      98 - 107 mmol/L 100   100    CO2      22.0 - 29.0 mmol/L 26.8   22.7    Calcium      8.6 - 10.5 mg/dL 9.9   10.0    Total Protein      6.0 - 8.5 g/dL 7.8   7.9    Albumin      3.5 - 5.2 g/dL 4.1   4.2    ALT (SGPT)      1 - 41 U/L 31   22    AST (SGOT)      1 - 40 U/L 21   19    Alkaline Phosphatase      39 - 117 U/L 61   73    Total Bilirubin      0.0 - 1.2 mg/dL <0.2   0.4    Globulin      gm/dL 3.7   3.7    A/G Ratio      g/dL 1.1   1.1    BUN/Creatinine Ratio      7.0 - 25.0  21.4   16.5    Anion Gap      5.0 - 15.0 mmol/L 12.2   14.3    eGFR      >60.0 mL/min/1.73 108.9   97.5    Total Cholesterol      0 - 200 mg/dL   205 (H)   "  Triglycerides      0 - 150 mg/dL   324 (H)    HDL Cholesterol      40 - 60 mg/dL   26 (L)    LDL Cholesterol       0 - 100 mg/dL   122 (H)    VLDL Cholesterol      5 - 40 mg/dL   57 (H)    LDL/HDL Ratio   4.39    Aldosterone      0.0 - 30.0 ng/dL 16.4      Renin Activity      0.167 - 5.380 ng/mL/hr 5.375      Aldosterone/Renin Ratio      0.0 - 30.0  3.1      Microalbumin/Creatinine Ratio   --    Creatinine, Urine      mg/dL   101.5    Microalbumin, Urine      mg/dL   <1.2    Normetanephrine      0.0 - 218.9 pg/mL 37.1      Metanephrine      0.0 - 88.0 pg/mL <25.0      RHONDA-65      0.0 - 5.0 U/mL <5.0      C-Peptide      1.1 - 4.4 ng/mL 5.8 (H)      ZNT8 Antibodies      U/mL <15      Islet Cell Ab      Neg:<1:1  Negative      Dexamethasone, Serum      ng/dL  319     Cortisol      6.2 - 19.4 ug/dL  0.7 (L)     Hemoglobin A1C      4.80 - 5.60 %   7.80 (H)       Legend:  (H) High  (L) Low  Objective   Vital Signs:  There were no vitals taken for this visit.  Estimated body mass index is 29.37 kg/m² as calculated from the following:    Height as of 9/20/24: 175.3 cm (69.02\").    Weight as of 9/20/24: 90.3 kg (199 lb).              Result Review :  The following data was reviewed by: Mahrokh Nokhbehzaeim, MD on 07/22/2025:  CMP          8/2/2024    09:40   CMP   Glucose 208    BUN 16    Creatinine 0.97    EGFR 97.5    Sodium 137    Potassium 4.8    Chloride 100    Calcium 10.0    Total Protein 7.9    Albumin 4.2    Globulin 3.7    Total Bilirubin 0.4    Alkaline Phosphatase 73    AST (SGOT) 19    ALT (SGPT) 22    Albumin/Globulin Ratio 1.1    BUN/Creatinine Ratio 16.5    Anion Gap 14.3      Lipid Panel          8/2/2024    09:40   Lipid Panel   Total Cholesterol 205    Triglycerides 324    HDL Cholesterol 26    VLDL Cholesterol 57    LDL Cholesterol  122    LDL/HDL Ratio 4.39                  Assessment and Plan   Diagnoses and all orders for this visit:    1. Type 2 diabetes mellitus with hyperglycemia, with long-term " current use of insulin (Primary)  Assessment & Plan:  Diabetes is uncontrolled.   Continue current treatment regimen.  Reminded to bring in blood sugar diary at next visit.  Recommended an ADA diet.  Recommended a Mediterranean style of eating  Regular aerobic exercise.  Discussed ways to avoid symptomatic hypoglycemia.  Discussed sick day management.  Discussed foot care.  Reminded to get yearly retinal exam.  Continue Lantus 20 units daily, if prebreakfast blood sugar less than 80 on 2 consecutive days decrease the dose by 2 units  Take Humalog 10 units, 15 minutes before meals  Start taking Ozempic.  Ozempic 0.25 mg weekly for 4 weeks then increase the dose to 0.5 mg weekly  Emphasized on the importance of medication compliance  Diabetes will be reassessed 4 months    Orders:  -     Semaglutide,0.25 or 0.5MG/DOS, (Ozempic, 0.25 or 0.5 MG/DOSE,) 2 MG/3ML solution pen-injector; Inject 0.25 mg under the skin into the appropriate area as directed 1 (One) Time Per Week. Take 0.25 mg weekly for 4 weeks then increase the dose to 0.5 mg weekly.  Dispense: 3 mL; Refill: 1  -     Hemoglobin A1c  -     Lipid Panel  -     Microalbumin / Creatinine Urine Ratio - Urine, Clean Catch  -     Comprehensive Metabolic Panel    2. Hyperlipidemia associated with type 2 diabetes mellitus  Assessment & Plan:   Continue Crestor repeat labs    Orders:  -     Lipid Panel    3. Left adrenal mass  Assessment & Plan:  Repeat dexamethasone suppression test  Repeat aldosterone renin ratio  Repeat CT adrenal    Orders:  -     Dexamethasone Level, Serum  -     dexAMETHasone (DECADRON) 1 MG tablet; Take 1 tablet by mouth 1 (One) Time for 1 dose. Take at bedtime the day before getting lab work  Dispense: 1 tablet; Refill: 0  -     Cortisol  -     Aldosterone / Renin Ratio  -     CT Abdomen Adrenals With & Without Contrast; Future             Follow Up   Return in about 4 months (around 11/22/2025).  Patient was given instructions and counseling  regarding his condition or for health maintenance advice. Please see specific information pulled into the AVS if appropriate.     Mode of Visit: Video  Location of patient: -HOME-  Location of provider: +[facility]+  You have chosen to receive care through a telehealth visit.  The patient has signed the video visit consent form.  The visit included audio and video interaction. No technical issues occurred during this visit.

## 2025-08-05 ENCOUNTER — SPECIALTY PHARMACY (OUTPATIENT)
Dept: ENDOCRINOLOGY | Age: 45
End: 2025-08-05
Payer: COMMERCIAL

## 2025-08-14 ENCOUNTER — SPECIALTY PHARMACY (OUTPATIENT)
Dept: ENDOCRINOLOGY | Age: 45
End: 2025-08-14
Payer: COMMERCIAL

## 2025-08-14 ENCOUNTER — TELEPHONE (OUTPATIENT)
Dept: ENDOCRINOLOGY | Age: 45
End: 2025-08-14
Payer: COMMERCIAL

## 2025-08-14 DIAGNOSIS — E11.65 TYPE 2 DIABETES MELLITUS WITH HYPERGLYCEMIA, WITHOUT LONG-TERM CURRENT USE OF INSULIN: ICD-10-CM

## 2025-08-14 RX ORDER — METFORMIN HYDROCHLORIDE 500 MG/1
1000 TABLET, EXTENDED RELEASE ORAL 2 TIMES DAILY
Qty: 360 TABLET | Refills: 0 | Status: SHIPPED | OUTPATIENT
Start: 2025-08-14 | End: 2026-08-14

## 2025-08-14 RX ORDER — ACYCLOVIR 800 MG/1
1 TABLET ORAL
Qty: 4 EACH | Refills: 0 | Status: SHIPPED | OUTPATIENT
Start: 2025-08-14

## 2025-08-29 ENCOUNTER — SPECIALTY PHARMACY (OUTPATIENT)
Dept: ENDOCRINOLOGY | Age: 45
End: 2025-08-29
Payer: COMMERCIAL

## 2025-08-29 RX ORDER — HYDROCHLOROTHIAZIDE 12.5 MG/1
CAPSULE ORAL
Qty: 4 EACH | Refills: 0 | Status: SHIPPED | OUTPATIENT
Start: 2025-08-29

## (undated) DEVICE — FRCP BX RADJAW4 NDL 2.8 240CM LG OG BX40

## (undated) DEVICE — CANN O2 ETCO2 FITS ALL CONN CO2 SMPL A/ 7IN DISP LF

## (undated) DEVICE — ADAPT CLN BIOGUARD AIR/H2O DISP

## (undated) DEVICE — KT ORCA ORCAPOD DISP STRL

## (undated) DEVICE — BLCK/BITE BLOX W/DENTL/RIM W/STRAP 54F

## (undated) DEVICE — LN SMPL CO2 SHTRM SD STREAM W/M LUER

## (undated) DEVICE — TBG SXN CONN/F UNIV 1/4IN 10FT LF STRL

## (undated) DEVICE — SENSR O2 OXIMAX FNGR A/ 18IN NONSTR